# Patient Record
Sex: MALE | Race: WHITE | Employment: FULL TIME | URBAN - METROPOLITAN AREA
[De-identification: names, ages, dates, MRNs, and addresses within clinical notes are randomized per-mention and may not be internally consistent; named-entity substitution may affect disease eponyms.]

---

## 2018-02-16 ENCOUNTER — OFFICE VISIT (OUTPATIENT)
Dept: FAMILY MEDICINE CLINIC | Age: 43
End: 2018-02-16

## 2018-02-16 VITALS
HEIGHT: 69 IN | HEART RATE: 86 BPM | BODY MASS INDEX: 42.95 KG/M2 | DIASTOLIC BLOOD PRESSURE: 86 MMHG | OXYGEN SATURATION: 94 % | WEIGHT: 290 LBS | SYSTOLIC BLOOD PRESSURE: 138 MMHG

## 2018-02-16 DIAGNOSIS — R07.9 CHEST PAIN OF UNKNOWN ETIOLOGY: Primary | ICD-10-CM

## 2018-02-16 DIAGNOSIS — E66.01 MORBIDLY OBESE (HCC): ICD-10-CM

## 2018-02-16 DIAGNOSIS — R07.89 OTHER CHEST PAIN: ICD-10-CM

## 2018-02-16 DIAGNOSIS — K92.1 BLOODY STOOL: ICD-10-CM

## 2018-02-16 LAB
ALBUMIN SERPL-MCNC: 5.1 G/DL (ref 3.4–5)
ALP BLD-CCNC: 84 U/L (ref 40–129)
ALT SERPL-CCNC: 59 U/L (ref 10–40)
ANION GAP SERPL CALCULATED.3IONS-SCNC: 16 MMOL/L (ref 3–16)
AST SERPL-CCNC: 39 U/L (ref 15–37)
BASOPHILS ABSOLUTE: 0.1 K/UL (ref 0–0.2)
BASOPHILS RELATIVE PERCENT: 0.9 %
BILIRUB SERPL-MCNC: 0.5 MG/DL (ref 0–1)
BILIRUBIN DIRECT: <0.2 MG/DL (ref 0–0.3)
BILIRUBIN, INDIRECT: ABNORMAL MG/DL (ref 0–1)
BUN BLDV-MCNC: 12 MG/DL (ref 7–20)
C-REACTIVE PROTEIN: 7.5 MG/L (ref 0–5.1)
CALCIUM SERPL-MCNC: 9.7 MG/DL (ref 8.3–10.6)
CHLORIDE BLD-SCNC: 100 MMOL/L (ref 99–110)
CO2: 26 MMOL/L (ref 21–32)
CREAT SERPL-MCNC: 0.8 MG/DL (ref 0.9–1.3)
EOSINOPHILS ABSOLUTE: 0.1 K/UL (ref 0–0.6)
EOSINOPHILS RELATIVE PERCENT: 0.7 %
GFR AFRICAN AMERICAN: >60
GFR NON-AFRICAN AMERICAN: >60
GLUCOSE BLD-MCNC: 101 MG/DL (ref 70–99)
HCT VFR BLD CALC: 45 % (ref 40.5–52.5)
HEMOGLOBIN: 15.6 G/DL (ref 13.5–17.5)
LYMPHOCYTES ABSOLUTE: 2 K/UL (ref 1–5.1)
LYMPHOCYTES RELATIVE PERCENT: 23.2 %
MCH RBC QN AUTO: 29.9 PG (ref 26–34)
MCHC RBC AUTO-ENTMCNC: 34.6 G/DL (ref 31–36)
MCV RBC AUTO: 86.4 FL (ref 80–100)
MONOCYTES ABSOLUTE: 0.7 K/UL (ref 0–1.3)
MONOCYTES RELATIVE PERCENT: 8 %
NEUTROPHILS ABSOLUTE: 5.9 K/UL (ref 1.7–7.7)
NEUTROPHILS RELATIVE PERCENT: 67.2 %
PDW BLD-RTO: 14.6 % (ref 12.4–15.4)
PLATELET # BLD: 183 K/UL (ref 135–450)
PMV BLD AUTO: 10.1 FL (ref 5–10.5)
POTASSIUM SERPL-SCNC: 4.2 MMOL/L (ref 3.5–5.1)
RBC # BLD: 5.21 M/UL (ref 4.2–5.9)
SODIUM BLD-SCNC: 142 MMOL/L (ref 136–145)
TOTAL PROTEIN: 7.2 G/DL (ref 6.4–8.2)
WBC # BLD: 8.7 K/UL (ref 4–11)

## 2018-02-16 PROCEDURE — 99214 OFFICE O/P EST MOD 30 MIN: CPT | Performed by: NURSE PRACTITIONER

## 2018-02-16 PROCEDURE — 93000 ELECTROCARDIOGRAM COMPLETE: CPT | Performed by: NURSE PRACTITIONER

## 2018-02-16 ASSESSMENT — ENCOUNTER SYMPTOMS
SHORTNESS OF BREATH: 1
NAUSEA: 1
ABDOMINAL PAIN: 1
HEMATOCHEZIA: 1
COUGH: 0

## 2018-02-16 NOTE — PROGRESS NOTES
Subjective:      Chief Complaint   Patient presents with    Rectal Bleeding     mid back pain       Patient ID: Anshu Pinedo is a 38 y.o. male who presents for blood in stool. Noticed bright red blood starting Monday. Blood described as clots. Very loose stool and today diarrhea. He finds blood on the toilet tissue and in the bowl. Toilet water was pink. A few times ago he noticed he had bright red blood in the commode. Negative for colon cancer in family. 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 Positive for diaphoresis, positive for nausea. Chest Pain    This is a recurrent problem. The current episode started more than 1 month ago. The onset quality is sudden. The problem occurs intermittently. The problem has been waxing and waning. The pain is present in the substernal region. The pain is at a severity of 7/10. The pain is moderate. The quality of the pain is described as heavy, pressure and squeezing. The pain radiates to the left arm, left jaw and left neck. Associated symptoms include abdominal pain, nausea and shortness of breath. Pertinent negatives include no cough or diaphoresis. Associated with: known. Risk factors include lack of exercise, stress, sedentary lifestyle, male gender and obesity. His past medical history is significant for anxiety/panic attacks. His family medical history is significant for CAD, heart disease, hyperlipidemia, hypertension and early MI. Prior workup: further cardiology. Rectal Bleeding    The current episode started more than 1 week ago. The onset was sudden. The problem has been rapidly worsening. The patient is experiencing no pain. The stool is described as soft, liquid and mixed with blood. Associated symptoms include abdominal pain, nausea and chest pain.  Pertinent negatives include no coughing. Patient Active Problem List   Diagnosis    Fever        Family History   Problem Relation Age of Onset    Heart Disease Mother        Social History     Social History    Marital status:      Spouse name: N/A    Number of children: N/A    Years of education: N/A     Occupational History    Not on file. Social History Main Topics    Smoking status: Former Smoker     Quit date: 1/1/2014    Smokeless tobacco: Never Used    Alcohol use No    Drug use: Yes     Types: Marijuana    Sexual activity: Not on file     Other Topics Concern    Not on file     Social History Narrative    No narrative on file       Current Outpatient Prescriptions on File Prior to Visit   Medication Sig Dispense Refill    ranitidine (ZANTAC) 150 MG tablet Take 1 tablet by mouth 2 times daily 60 tablet 3     No current facility-administered medications on file prior to visit. Review of Systems   Constitutional: Negative for diaphoresis. Respiratory: Positive for shortness of breath. Negative for cough. Cardiovascular: Positive for chest pain. Gastrointestinal: Positive for abdominal pain, hematochezia and nausea. Neurological: Negative. Psychiatric/Behavioral: The patient is nervous/anxious. Objective:     Physical Exam   Constitutional: He is oriented to person, place, and time. He appears well-developed and well-nourished. HENT:   Head: Normocephalic and atraumatic. Eyes: Conjunctivae are normal.   Neck: Neck supple. Cardiovascular: Regular rhythm and normal heart sounds. Exam reveals no gallop and no friction rub. No murmur heard. Pulmonary/Chest: Effort normal and breath sounds normal. He has no wheezes. He has no rales. Abdominal: Soft. Bowel sounds are normal. He exhibits no distension and no mass. There is tenderness. There is no rebound and no guarding. No hernia. Musculoskeletal: Normal range of motion.    Neurological: He is alert and oriented to person, place,

## 2018-02-16 NOTE — PATIENT INSTRUCTIONS
Thank you for enrolling in [address] Please follow the instructions below to securely access your online medical record. [employer] allows you to send messages to your doctor, view your test results, renew your prescriptions, schedule appointments, and more. How Do I Sign Up? 1. In your Internet browser, go to [email or web address]  2. Click on the Sign Up Now link in the Sign In box. You will see the New Member Sign Up page. 3. Enter your [employer] Access Code exactly as it appears below. You will not need to use this code after youve completed the sign-up process. If you do not sign up before the expiration date, you must request a new code. [employer] Access Code: Activation code not generated  Current [employer] Status: Active    4. Enter your Social Security Number ([id number]) and Date of Birth (mm/dd/yyyy) as indicated and click Submit. You will be taken to the next sign-up page. 5. Create a [employer] ID. This will be your [employer] login ID and cannot be changed, so think of one that is secure and easy to remember. 6. Create a [employer] password. You can change your password at any time. 7. Enter your Password Reset Question and Answer. This can be used at a later time if you forget your password. 8. Enter your e-mail address. You will receive e-mail notification when new information is available in [address] 9. Click Sign Up. You can now view your medical record. Additional Information  If you have questions, please contact your physician practice where you receive care. Remember, [employer] is NOT to be used for urgent needs. For medical emergencies, dial 911.

## 2018-02-20 ENCOUNTER — OFFICE VISIT (OUTPATIENT)
Dept: CARDIOLOGY CLINIC | Age: 43
End: 2018-02-20

## 2018-02-20 VITALS
WEIGHT: 289 LBS | DIASTOLIC BLOOD PRESSURE: 78 MMHG | BODY MASS INDEX: 42.8 KG/M2 | HEIGHT: 69 IN | OXYGEN SATURATION: 94 % | SYSTOLIC BLOOD PRESSURE: 134 MMHG | HEART RATE: 80 BPM

## 2018-02-20 DIAGNOSIS — R06.02 SOB (SHORTNESS OF BREATH): ICD-10-CM

## 2018-02-20 DIAGNOSIS — R07.2 PRECORDIAL PAIN: ICD-10-CM

## 2018-02-20 PROCEDURE — 99244 OFF/OP CNSLTJ NEW/EST MOD 40: CPT | Performed by: INTERNAL MEDICINE

## 2018-02-20 NOTE — COMMUNICATION BODY
Discussed   Former smoker  FH of CAD  Abnormal EKG - reviewed w/ pt      Plan:  [facility] Myoview: Discussed angiogram R/B/A/E  if stress test abnormal   Echo   F/u with me after testing     Luci Slider.  Marylen Norlander, M.D., [facility]

## 2018-02-20 NOTE — LETTER
· Liver/Spleen: No Abnormalities Noted  Neurological:  · Alert and oriented in all spheres  · Moves all extremities well  · Exhibits normal gait balance and coordination  Psychiatric:  · No abnormalities of mood, affect, memory, mentation, or behavior are noted  Skin:  · Warm and Dry  · No rashes        Assessment:     1. Chest pain - atypical   2. SOB (shortness of breath)    3. Blood in stool -  Scheduled to f/u with GI   Obesity - diet and exercise  Discussed   Former smoker  FH of CAD  Abnormal EKG - reviewed w/ pt      Plan:  GXT Myoview: Discussed angiogram R/B/A/E  if stress test abnormal   Echo   F/u with me after testing     Luis Daniel Elvira. Mayte Hickman M.D., Aneta Peoples        If you have questions, please do not hesitate to call me. I look forward to following Marcus Evans along with you.     Sincerely,        Bere Johnson MD

## 2018-02-22 ENCOUNTER — HOSPITAL ENCOUNTER (OUTPATIENT)
Dept: SURGERY | Age: 43
Discharge: HOME | End: 2018-02-22
Attending: INTERNAL MEDICINE | Admitting: INTERNAL MEDICINE

## 2018-02-22 VITALS
TEMPERATURE: 97.5 F | SYSTOLIC BLOOD PRESSURE: 135 MMHG | DIASTOLIC BLOOD PRESSURE: 89 MMHG | BODY MASS INDEX: 42.8 KG/M2 | WEIGHT: 289 LBS | OXYGEN SATURATION: 98 % | RESPIRATION RATE: 18 BRPM | HEIGHT: 69 IN | HEART RATE: 94 BPM

## 2018-02-22 DIAGNOSIS — Z12.11 ENCOUNTER FOR SCREENING FOR MALIGNANT NEOPLASM OF COLON: ICD-10-CM

## 2018-02-22 RX ORDER — IBUPROFEN 400 MG/1
400 TABLET ORAL EVERY 6 HOURS PRN
COMMUNITY
End: 2018-03-23

## 2018-02-22 RX ORDER — LIDOCAINE HYDROCHLORIDE 10 MG/ML
0.1 INJECTION, SOLUTION EPIDURAL; INFILTRATION; INTRACAUDAL; PERINEURAL
Status: ACTIVE | OUTPATIENT
Start: 2018-02-22 | End: 2018-02-22

## 2018-02-22 RX ORDER — SODIUM CHLORIDE, SODIUM LACTATE, POTASSIUM CHLORIDE, CALCIUM CHLORIDE 600; 310; 30; 20 MG/100ML; MG/100ML; MG/100ML; MG/100ML
INJECTION, SOLUTION INTRAVENOUS ONCE
Status: COMPLETED | OUTPATIENT
Start: 2018-02-22 | End: 2018-02-22

## 2018-02-22 RX ORDER — ACETAMINOPHEN 325 MG/1
1000 TABLET ORAL EVERY 6 HOURS PRN
COMMUNITY
End: 2022-08-19

## 2018-02-22 RX ADMIN — SODIUM CHLORIDE, SODIUM LACTATE, POTASSIUM CHLORIDE, CALCIUM CHLORIDE: 600; 310; 30; 20 INJECTION, SOLUTION INTRAVENOUS at 08:18

## 2018-02-22 ASSESSMENT — PAIN - FUNCTIONAL ASSESSMENT: PAIN_FUNCTIONAL_ASSESSMENT: 0-10

## 2018-02-22 ASSESSMENT — PAIN SCALES - GENERAL: PAINLEVEL_OUTOF10: 0

## 2018-02-22 NOTE — OP NOTE
Colonoscopy Note    Patient:   Fred Aguilar    YOB: 1975    Facility:   [facility] [Outpatient]  Referring/PCP: Glo Bran MD  Procedure:   Colonoscopy --diagnostic  Date:     2/22/2018  Endoscopist:  Valeriy Campuzano MD    Preoperative Diagnosis: Hematochezia and chronic diarrhea. Postoperative Diagnosis:  Polyps     Anesthesia: Versed 12 mg IV, fentanyl 100 mcg IV    Estimated blood loss: < 5 ml    Complications:  None    Description of Procedure:  Informed consent was obtained from the patient after explanation of the procedure including indications, description of the procedure,  benefits and possible risks and complications of the procedure, and alternatives. Questions were answered. The patient's history was reviewed and a directed physical examination was performed prior to the procedure. Patient was monitored throughout the procedure with pulse oximetry and periodic assessment of vital signs. Patient was sedated as noted above. The Nursing staff and I performed a time out. With the patient initially in the left lateral decubitus position, a digital rectal examination was performed and revealed negative without mass, lesions or tenderness. The Olympus video colonoscope was placed in the patient's rectum and advanced without difficulty  to the cecum, which was identified by the ileocecal valve and appendiceal orifice. Photographs were taken. The prep was good. Examination of the mucosa was performed during both introduction and withdrawal of the colonoscope. Retroflexed view of the rectum was performed. Findings:     1. Small transv colon polyp removed w cold-Bx and 1 cm sigmoid polyp cold-snared  2. Normal mucosa, random-biopsied and small int hemorrhoids     Recommendations:  Await pathology.      Valeriy Campuzano MD       (O) [phone]        Valeriy Campuzano MD

## 2018-02-23 ENCOUNTER — HOSPITAL ENCOUNTER (OUTPATIENT)
Dept: CARDIOLOGY | Age: 43
Discharge: HOME | End: 2018-02-23
Attending: INTERNAL MEDICINE | Admitting: INTERNAL MEDICINE

## 2018-02-23 ENCOUNTER — HOSPITAL ENCOUNTER (OUTPATIENT)
Dept: CARDIOLOGY | Age: 43
Discharge: HOME | End: 2018-02-24
Admitting: INTERNAL MEDICINE

## 2018-02-23 ENCOUNTER — TELEPHONE (OUTPATIENT)
Dept: CARDIOLOGY CLINIC | Age: 43
End: 2018-02-23

## 2018-02-23 DIAGNOSIS — Z12.11 ENCOUNTER FOR SCREENING FOR MALIGNANT NEOPLASM OF COLON: ICD-10-CM

## 2018-02-23 LAB
LV EF: 55 %
LV EF: 66 %
LVEF MODALITY: NORMAL
LVEF MODALITY: NORMAL

## 2018-03-02 ENCOUNTER — TELEPHONE (OUTPATIENT)
Dept: FAMILY MEDICINE CLINIC | Age: 43
End: 2018-03-02

## 2018-03-09 ENCOUNTER — TELEPHONE (OUTPATIENT)
Dept: FAMILY MEDICINE CLINIC | Age: 43
End: 2018-03-09

## 2018-03-09 ENCOUNTER — OFFICE VISIT (OUTPATIENT)
Dept: FAMILY MEDICINE CLINIC | Age: 43
End: 2018-03-09

## 2018-03-09 VITALS
BODY MASS INDEX: 38.04 KG/M2 | SYSTOLIC BLOOD PRESSURE: 156 MMHG | HEART RATE: 104 BPM | OXYGEN SATURATION: 97 % | HEIGHT: 73 IN | DIASTOLIC BLOOD PRESSURE: 102 MMHG | WEIGHT: 287 LBS

## 2018-03-09 DIAGNOSIS — R53.83 FATIGUE, UNSPECIFIED TYPE: Primary | ICD-10-CM

## 2018-03-09 DIAGNOSIS — K64.8 INFLAMED INTERNAL HEMORRHOID: ICD-10-CM

## 2018-03-09 PROCEDURE — 99214 OFFICE O/P EST MOD 30 MIN: CPT | Performed by: NURSE PRACTITIONER

## 2018-03-09 RX ORDER — HYDROCODONE BITARTRATE AND ACETAMINOPHEN 10; 325 MG/1; MG/1
1 TABLET ORAL EVERY 6 HOURS PRN
Qty: 30 TABLET | Refills: 0 | Status: SHIPPED | OUTPATIENT
Start: 2018-03-09 | End: 2018-03-16

## 2018-03-09 ASSESSMENT — PATIENT HEALTH QUESTIONNAIRE - PHQ9
1. LITTLE INTEREST OR PLEASURE IN DOING THINGS: 0
SUM OF ALL RESPONSES TO PHQ QUESTIONS 1-9: 0
2. FEELING DOWN, DEPRESSED OR HOPELESS: 0
SUM OF ALL RESPONSES TO PHQ9 QUESTIONS 1 & 2: 0

## 2018-03-09 ASSESSMENT — ENCOUNTER SYMPTOMS
RESPIRATORY NEGATIVE: 1
BACK PAIN: 1
HEARTBURN: 1

## 2018-03-09 NOTE — PROGRESS NOTES
pains  & joint pain      ranitidine (ZANTAC) 150 MG tablet Take 1 tablet by mouth 2 times daily 60 tablet 3     No current facility-administered medications on file prior to visit. Review of Systems   Respiratory: Negative. Cardiovascular: Negative. Hypertension BP upon admission to room 166/102 rechecked it was 144/88   Gastrointestinal: Positive for heartburn. Internal hemorrhoids   Musculoskeletal: Positive for back pain, joint pain, myalgias and neck pain. Patient Active Problem List    Diagnosis Date Noted    Precordial pain 02/20/2018    SOB (shortness of breath) 02/20/2018    Fever 08/15/2016       Objective:     Physical Exam   Constitutional: He is oriented to person, place, and time. He appears well-developed and well-nourished. HENT:   Head: Normocephalic and atraumatic. Eyes: Conjunctivae are normal.   Neck: Neck supple. Cardiovascular: Regular rhythm and normal heart sounds. Exam reveals no gallop and no friction rub. No murmur heard. Pulmonary/Chest: Effort normal and breath sounds normal. He has no wheezes. He has no rales. Abdominal: Soft. Musculoskeletal: Normal range of motion. Neurological: He is alert and oriented to person, place, and time. Skin: Capillary refill takes 2 to 3 seconds. Psychiatric: He has a normal mood and affect. His behavior is normal. Judgment and thought content normal.   Nursing note and vitals reviewed. Assessment:       ICD-10-CM ICD-9-CM    1. Fatigue, unspecified type R53.83 780.79 TSH without Reflex      T4      Vitamin D 25 Hydroxy      Corrine Ragland MD (Rheumatology)   2.  Inflamed internal hemorrhoid K64.8 455.0 HYDROcodone-acetaminophen (NORCO)  MG per tablet       Plan:     Orders Placed This Encounter   Procedures    TSH without Reflex     Standing Status:   Future     Standing Expiration Date:   3/9/2019    T4     Standing Status:   Future     Standing Expiration Date:   3/9/2019   [facility]

## 2018-03-09 NOTE — TELEPHONE ENCOUNTER
Called patient to discuss labs, he wishes to come in for an appointment and talk about them.  Appointment will be made by the

## 2018-03-09 NOTE — PATIENT INSTRUCTIONS
Thank you for enrolling in [address] Please follow the instructions below to securely access your online medical record. [employer] allows you to send messages to your doctor, view your test results, renew your prescriptions, schedule appointments, and more. How Do I Sign Up? 1. In your Internet browser, go to [email or web address]  2. Click on the Sign Up Now link in the Sign In box. You will see the New Member Sign Up page. 3. Enter your [employer] Access Code exactly as it appears below. You will not need to use this code after youve completed the sign-up process. If you do not sign up before the expiration date, you must request a new code. [employer] Access Code: Activation code not generated  Current [employer] Status: Active    4. Enter your Social Security Number ([id number]) and Date of Birth (mm/dd/yyyy) as indicated and click Submit. You will be taken to the next sign-up page. 5. Create a [employer] ID. This will be your [employer] login ID and cannot be changed, so think of one that is secure and easy to remember. 6. Create a [employer] password. You can change your password at any time. 7. Enter your Password Reset Question and Answer. This can be used at a later time if you forget your password. 8. Enter your e-mail address. You will receive e-mail notification when new information is available in [address] 9. Click Sign Up. You can now view your medical record. Additional Information  If you have questions, please contact your physician practice where you receive care. Remember, [employer] is NOT to be used for urgent needs. For medical emergencies, dial 911.

## 2018-03-12 ENCOUNTER — TELEPHONE (OUTPATIENT)
Dept: FAMILY MEDICINE CLINIC | Age: 43
End: 2018-03-12

## 2018-03-12 DIAGNOSIS — K64.8 OTHER HEMORRHOIDS: Primary | ICD-10-CM

## 2018-03-14 ENCOUNTER — TELEPHONE (OUTPATIENT)
Dept: FAMILY MEDICINE CLINIC | Age: 43
End: 2018-03-14

## 2018-03-14 NOTE — TELEPHONE ENCOUNTER
Dinora called back and the [place] is in regards to the dates. Backdate Feb 20th, 22nd, and 23rd. End date is unknown because [place] has two more appts with specialist and that will determine if he needs to do anything else with them.  Those appts are at the end of March    [place] said her company gave her intermittent leave until August to cover any other appts in between      Richiee tree phone#: [phone] if any other questions

## 2018-03-19 ENCOUNTER — TELEPHONE (OUTPATIENT)
Dept: FAMILY MEDICINE CLINIC | Age: 43
End: 2018-03-19

## 2018-03-23 ENCOUNTER — OFFICE VISIT (OUTPATIENT)
Dept: RHEUMATOLOGY | Age: 43
End: 2018-03-23

## 2018-03-23 ENCOUNTER — HOSPITAL ENCOUNTER (OUTPATIENT)
Dept: OTHER | Age: 43
Discharge: HOME | End: 2018-03-23
Attending: INTERNAL MEDICINE | Admitting: INTERNAL MEDICINE

## 2018-03-23 VITALS
TEMPERATURE: 98 F | BODY MASS INDEX: 37.51 KG/M2 | SYSTOLIC BLOOD PRESSURE: 120 MMHG | HEIGHT: 73 IN | DIASTOLIC BLOOD PRESSURE: 80 MMHG | WEIGHT: 283 LBS

## 2018-03-23 DIAGNOSIS — M25.50 POLYARTHRALGIA: ICD-10-CM

## 2018-03-23 DIAGNOSIS — M25.50 POLYARTHRALGIA: Primary | ICD-10-CM

## 2018-03-23 DIAGNOSIS — R79.89 ELEVATED LFTS: ICD-10-CM

## 2018-03-23 LAB
RHEUMATOID FACTOR: <10 IU/ML
URIC ACID, SERUM: 7.5 MG/DL (ref 3.5–7.2)

## 2018-03-23 PROCEDURE — 99245 OFF/OP CONSLTJ NEW/EST HI 55: CPT | Performed by: INTERNAL MEDICINE

## 2018-03-23 RX ORDER — PREDNISONE 10 MG/1
TABLET ORAL
Qty: 35 TABLET | Refills: 0 | Status: SHIPPED | OUTPATIENT
Start: 2018-03-23 | End: 2019-12-12

## 2018-03-23 RX ORDER — HYDROCODONE BITARTRATE AND ACETAMINOPHEN 10; 325 MG/1; MG/1
1 TABLET ORAL EVERY 6 HOURS PRN
COMMUNITY
End: 2019-12-12

## 2018-03-23 NOTE — PROGRESS NOTES
paresthesia. All other ROS are negative. No past medical history on file. No past surgical history on file. No family history of autoimmune diseases    Current Outpatient Prescriptions   Medication Sig Dispense Refill    HYDROcodone-acetaminophen (NORCO)  MG per tablet Take 1 tablet by mouth every 6 hours as needed for Pain.  predniSONE (DELTASONE) 10 MG tablet 2 tab po daily x 7 days. 1.5 tab po daily x 7 days. 1 tab po daily x 7 days. 1/2 tab po daily 7 days and stop. 35 tablet 0    acetaminophen (TYLENOL) 325 MG tablet Take 1,000 mg by mouth every 6 hours as needed for Pain      ranitidine (ZANTAC) 150 MG tablet Take 1 tablet by mouth 2 times daily 60 tablet 3     No current facility-administered medications for this visit. No Known Allergies    PHYSICAL EXAM:    Vitals:    /80   Temp 98 °F (36.7 °C) (Oral)   Ht 6' 0.99\" (1.854 m)   Wt 283 lb (128.4 kg)   BMI 37.35 kg/m²   General appearance/ Psychiatric: well nourished, and well groomed, normal judgement, alert, appears stated age and cooperative. MKS:   28 joint JOINT COUNT:                               Right                                                  Left   Swell Tender Swell Tender   PIP1           PIP2           PIP3           PIP4          PIP5          MCP1           MCP2           MCP3           MCP4           MCP5           Wrist           Elbow           Shoulder          Knee             Subjective arthralgias across MCPs, PIPs without any objective tenderness, swelling or synovitis. Full fist bilaterally. Bony crepitus in both knees, right knee has trace effusion, has full range of motion, without any tenderness. Ankle and feet joints are non-tender. Hips have full range of motion. Spine-paraspinal muscle spasm mainly in thoracic-lower and upper lumbar area with tenderness. SLR negative.   Gait- Normal   Muscle strength 5/5 proximally and distally in upper and lower extremities  FMS tender points 4

## 2018-03-25 LAB — CCP IGG ANTIBODIES: 4 UNITS (ref 0–19)

## 2018-03-27 ENCOUNTER — OFFICE VISIT (OUTPATIENT)
Dept: SURGERY | Age: 43
End: 2018-03-27

## 2018-03-27 VITALS
BODY MASS INDEX: 42.8 KG/M2 | WEIGHT: 289 LBS | DIASTOLIC BLOOD PRESSURE: 80 MMHG | SYSTOLIC BLOOD PRESSURE: 132 MMHG | HEIGHT: 69 IN

## 2018-03-27 DIAGNOSIS — R19.7 DIARRHEA, UNSPECIFIED TYPE: ICD-10-CM

## 2018-03-27 DIAGNOSIS — K64.8 BLEEDING INTERNAL HEMORRHOIDS: Primary | ICD-10-CM

## 2018-03-27 DIAGNOSIS — D36.9 TUBULAR ADENOMA: ICD-10-CM

## 2018-03-27 PROCEDURE — 99203 OFFICE O/P NEW LOW 30 MIN: CPT | Performed by: SURGERY

## 2018-03-27 NOTE — PATIENT INSTRUCTIONS
TOILET. DO NOT READ OR PLAY CELL PHONE GAMES ON THE TOILET. Stool regimen should be trialed for 6-8 weeks before considering additional procedures or surgery. What procedures are available for internal hemorrhoids that do not respond to the above stool regimen:    · Rubber Band Ligation: This procedure is performed in the office without anesthetic. A small scope is placed into the anus and small rubber bands are placed over the hemorrhoid tissue. This causes excess tissue to fall off and scar into the rectum. This is done for patients with internal hemorrhoids only. Often this procedure needs to be repeated. Complication rates are very low. There is only minor discomfort and no down time. · Surgical excision (Hemorrhoidectomy): This surgery is performed in the operating room with sedation. The hemorrhoid tissue is cut out and wounds are stitched back together. It has low complications rates and has a 95% long term success rate. It is a painful procedure, however, and most patients require at least 2 weeks off from work/school. This may be recommended for patients with large hemorrhoids, and those who wish to have internal and external hemorrhoids addressed simultaneously, and those who desire the most definitive procedure. · Colonoscopy: This is an adjunct procedure in patients with rectal bleeding. Depending on your age and family history, colonoscopy may be recommended before pursuing hemorrhoid procedures. This is to ensure that the source of bleeding is truly hemorrhoids, and not from polyps, cancer, or inflammation located elsewhere in the colon or rectum. What about over-the-counter ointments, creams, and suppositories? Unfortunately for consumers, there is very little actual scientific data to support the use of any over-the-counter products. These usually aren't harmful to try for a few weeks, and may help with some symptoms, but all in all are a waste of money.  Again, these will just

## 2018-04-06 ENCOUNTER — OFFICE VISIT (OUTPATIENT)
Dept: RHEUMATOLOGY | Age: 43
End: 2018-04-06

## 2018-04-06 VITALS
HEIGHT: 69 IN | BODY MASS INDEX: 42.21 KG/M2 | SYSTOLIC BLOOD PRESSURE: 120 MMHG | TEMPERATURE: 98.7 F | DIASTOLIC BLOOD PRESSURE: 78 MMHG | WEIGHT: 285 LBS

## 2018-04-06 DIAGNOSIS — M15.9 GENERALIZED OSTEOARTHRITIS: Primary | ICD-10-CM

## 2018-04-06 DIAGNOSIS — M17.0 PRIMARY OSTEOARTHRITIS OF BOTH KNEES: ICD-10-CM

## 2018-04-06 PROCEDURE — 20610 DRAIN/INJ JOINT/BURSA W/O US: CPT | Performed by: INTERNAL MEDICINE

## 2018-04-06 PROCEDURE — 99214 OFFICE O/P EST MOD 30 MIN: CPT | Performed by: INTERNAL MEDICINE

## 2018-04-06 RX ORDER — METHOCARBAMOL 500 MG/1
TABLET, FILM COATED ORAL
Qty: 90 TABLET | Refills: 1 | Status: SHIPPED | OUTPATIENT
Start: 2018-04-06 | End: 2019-12-12

## 2018-04-06 RX ORDER — TRIAMCINOLONE ACETONIDE 40 MG/ML
40 INJECTION, SUSPENSION INTRA-ARTICULAR; INTRAMUSCULAR ONCE
Status: COMPLETED | OUTPATIENT
Start: 2018-04-06 | End: 2018-04-06

## 2018-04-06 RX ORDER — TRAMADOL HYDROCHLORIDE 50 MG/1
TABLET ORAL
Qty: 60 TABLET | Refills: 2 | Status: SHIPPED | OUTPATIENT
Start: 2018-04-06 | End: 2018-05-06

## 2018-04-06 RX ADMIN — TRIAMCINOLONE ACETONIDE 40 MG: 40 INJECTION, SUSPENSION INTRA-ARTICULAR; INTRAMUSCULAR at 10:15

## 2018-04-06 RX ADMIN — TRIAMCINOLONE ACETONIDE 40 MG: 40 INJECTION, SUSPENSION INTRA-ARTICULAR; INTRAMUSCULAR at 10:20

## 2018-04-10 ENCOUNTER — TELEPHONE (OUTPATIENT)
Dept: RHEUMATOLOGY | Age: 43
End: 2018-04-10

## 2018-04-11 DIAGNOSIS — M25.562 CHRONIC PAIN OF LEFT KNEE: Primary | ICD-10-CM

## 2018-04-11 DIAGNOSIS — G89.29 CHRONIC PAIN OF LEFT KNEE: Primary | ICD-10-CM

## 2018-04-11 RX ORDER — HYDROCODONE BITARTRATE AND ACETAMINOPHEN 5; 325 MG/1; MG/1
TABLET ORAL
Qty: 28 TABLET | Refills: 0 | Status: SHIPPED | OUTPATIENT
Start: 2018-04-11 | End: 2018-05-11

## 2019-12-04 ENCOUNTER — OFFICE VISIT (OUTPATIENT)
Dept: ORTHOPEDIC SURGERY | Age: 44
End: 2019-12-04
Payer: COMMERCIAL

## 2019-12-04 VITALS
HEIGHT: 69 IN | WEIGHT: 285.06 LBS | DIASTOLIC BLOOD PRESSURE: 91 MMHG | HEART RATE: 96 BPM | BODY MASS INDEX: 42.22 KG/M2 | SYSTOLIC BLOOD PRESSURE: 143 MMHG

## 2019-12-04 DIAGNOSIS — S46.211A RUPTURE OF RIGHT DISTAL BICEPS TENDON, INITIAL ENCOUNTER: ICD-10-CM

## 2019-12-04 DIAGNOSIS — M25.521 RIGHT ELBOW PAIN: Primary | ICD-10-CM

## 2019-12-04 PROCEDURE — 99203 OFFICE O/P NEW LOW 30 MIN: CPT | Performed by: PHYSICIAN ASSISTANT

## 2019-12-04 PROCEDURE — 29105 APPLICATION LONG ARM SPLINT: CPT | Performed by: PHYSICIAN ASSISTANT

## 2019-12-05 ENCOUNTER — TELEPHONE (OUTPATIENT)
Dept: ORTHOPEDIC SURGERY | Age: 44
End: 2019-12-05

## 2019-12-12 ENCOUNTER — ANESTHESIA EVENT (OUTPATIENT)
Dept: OPERATING ROOM | Age: 44
End: 2019-12-12
Payer: COMMERCIAL

## 2019-12-12 ENCOUNTER — OFFICE VISIT (OUTPATIENT)
Dept: ORTHOPEDIC SURGERY | Age: 44
End: 2019-12-12
Payer: COMMERCIAL

## 2019-12-12 ENCOUNTER — TELEPHONE (OUTPATIENT)
Dept: ORTHOPEDIC SURGERY | Age: 44
End: 2019-12-12

## 2019-12-12 VITALS — WEIGHT: 285.06 LBS | BODY MASS INDEX: 42.22 KG/M2 | RESPIRATION RATE: 17 BRPM | HEIGHT: 69 IN

## 2019-12-12 DIAGNOSIS — S46.211A RUPTURE OF RIGHT DISTAL BICEPS TENDON, INITIAL ENCOUNTER: Primary | ICD-10-CM

## 2019-12-12 PROCEDURE — 99242 OFF/OP CONSLTJ NEW/EST SF 20: CPT | Performed by: ORTHOPAEDIC SURGERY

## 2019-12-12 RX ORDER — IBUPROFEN 800 MG/1
800 TABLET ORAL EVERY 6 HOURS PRN
COMMUNITY
End: 2020-03-13 | Stop reason: ALTCHOICE

## 2019-12-13 ENCOUNTER — HOSPITAL ENCOUNTER (OUTPATIENT)
Age: 44
Setting detail: OUTPATIENT SURGERY
Discharge: HOME | End: 2019-12-13
Attending: ORTHOPAEDIC SURGERY | Admitting: ORTHOPAEDIC SURGERY
Payer: COMMERCIAL

## 2019-12-13 ENCOUNTER — ANESTHESIA (OUTPATIENT)
Dept: OPERATING ROOM | Age: 44
End: 2019-12-13
Payer: COMMERCIAL

## 2019-12-13 VITALS
BODY MASS INDEX: 42.81 KG/M2 | DIASTOLIC BLOOD PRESSURE: 72 MMHG | HEART RATE: 86 BPM | SYSTOLIC BLOOD PRESSURE: 118 MMHG | RESPIRATION RATE: 11 BRPM | OXYGEN SATURATION: 95 % | TEMPERATURE: 98.4 F | WEIGHT: 290 LBS

## 2019-12-13 VITALS — DIASTOLIC BLOOD PRESSURE: 118 MMHG | OXYGEN SATURATION: 96 % | SYSTOLIC BLOOD PRESSURE: 150 MMHG | TEMPERATURE: 97.7 F

## 2019-12-13 DIAGNOSIS — S46.211A RUPTURE OF RIGHT DISTAL BICEPS TENDON, INITIAL ENCOUNTER: Primary | ICD-10-CM

## 2019-12-13 PROCEDURE — 3600000005 HC SURGERY LEVEL 5 BASE: Performed by: ORTHOPAEDIC SURGERY

## 2019-12-13 PROCEDURE — 2580000003 HC RX 258: Performed by: ANESTHESIOLOGY

## 2019-12-13 PROCEDURE — 2500000003 HC RX 250 WO HCPCS: Performed by: NURSE ANESTHETIST, CERTIFIED REGISTERED

## 2019-12-13 PROCEDURE — 6360000002 HC RX W HCPCS: Performed by: ORTHOPAEDIC SURGERY

## 2019-12-13 PROCEDURE — C1713 ANCHOR/SCREW BN/BN,TIS/BN: HCPCS | Performed by: ORTHOPAEDIC SURGERY

## 2019-12-13 PROCEDURE — 3700000000 HC ANESTHESIA ATTENDED CARE: Performed by: ORTHOPAEDIC SURGERY

## 2019-12-13 PROCEDURE — 6360000002 HC RX W HCPCS: Performed by: NURSE ANESTHETIST, CERTIFIED REGISTERED

## 2019-12-13 PROCEDURE — 2500000003 HC RX 250 WO HCPCS: Performed by: ANESTHESIOLOGY

## 2019-12-13 PROCEDURE — 3600000015 HC SURGERY LEVEL 5 ADDTL 15MIN: Performed by: ORTHOPAEDIC SURGERY

## 2019-12-13 PROCEDURE — 2709999900 HC NON-CHARGEABLE SUPPLY: Performed by: ORTHOPAEDIC SURGERY

## 2019-12-13 PROCEDURE — 7100000011 HC PHASE II RECOVERY - ADDTL 15 MIN: Performed by: ORTHOPAEDIC SURGERY

## 2019-12-13 PROCEDURE — 7100000000 HC PACU RECOVERY - FIRST 15 MIN: Performed by: ORTHOPAEDIC SURGERY

## 2019-12-13 PROCEDURE — 6370000000 HC RX 637 (ALT 250 FOR IP): Performed by: ANESTHESIOLOGY

## 2019-12-13 PROCEDURE — 2580000003 HC RX 258: Performed by: ORTHOPAEDIC SURGERY

## 2019-12-13 PROCEDURE — 64415 NJX AA&/STRD BRCH PLXS IMG: CPT | Performed by: ANESTHESIOLOGY

## 2019-12-13 PROCEDURE — 6360000002 HC RX W HCPCS: Performed by: ANESTHESIOLOGY

## 2019-12-13 PROCEDURE — 3700000001 HC ADD 15 MINUTES (ANESTHESIA): Performed by: ORTHOPAEDIC SURGERY

## 2019-12-13 PROCEDURE — 7100000001 HC PACU RECOVERY - ADDTL 15 MIN: Performed by: ORTHOPAEDIC SURGERY

## 2019-12-13 PROCEDURE — 7100000010 HC PHASE II RECOVERY - FIRST 15 MIN: Performed by: ORTHOPAEDIC SURGERY

## 2019-12-13 DEVICE — SYSTEM IMPL DST BICEPS REP DEL W/ BICEPS BTTN 7X10MM PEEK: Type: IMPLANTABLE DEVICE | Site: ELBOW | Status: FUNCTIONAL

## 2019-12-13 RX ORDER — FENTANYL CITRATE 50 UG/ML
INJECTION, SOLUTION INTRAMUSCULAR; INTRAVENOUS PRN
Status: DISCONTINUED | OUTPATIENT
Start: 2019-12-13 | End: 2019-12-13 | Stop reason: SDUPTHER

## 2019-12-13 RX ORDER — LIDOCAINE HYDROCHLORIDE 20 MG/ML
INJECTION, SOLUTION INFILTRATION; PERINEURAL PRN
Status: DISCONTINUED | OUTPATIENT
Start: 2019-12-13 | End: 2019-12-13 | Stop reason: SDUPTHER

## 2019-12-13 RX ORDER — SODIUM CHLORIDE 0.9 % (FLUSH) 0.9 %
10 SYRINGE (ML) INJECTION EVERY 12 HOURS SCHEDULED
Status: DISCONTINUED | OUTPATIENT
Start: 2019-12-13 | End: 2019-12-13 | Stop reason: HOSPADM

## 2019-12-13 RX ORDER — OXYCODONE HYDROCHLORIDE AND ACETAMINOPHEN 5; 325 MG/1; MG/1
1 TABLET ORAL EVERY 6 HOURS PRN
Qty: 28 TABLET | Refills: 0 | Status: SHIPPED | OUTPATIENT
Start: 2019-12-13 | End: 2019-12-20

## 2019-12-13 RX ORDER — ONDANSETRON 2 MG/ML
4 INJECTION INTRAMUSCULAR; INTRAVENOUS EVERY 30 MIN PRN
Status: DISCONTINUED | OUTPATIENT
Start: 2019-12-13 | End: 2019-12-13 | Stop reason: HOSPADM

## 2019-12-13 RX ORDER — MAGNESIUM HYDROXIDE 1200 MG/15ML
LIQUID ORAL CONTINUOUS PRN
Status: COMPLETED | OUTPATIENT
Start: 2019-12-13 | End: 2019-12-13

## 2019-12-13 RX ORDER — BUPIVACAINE HYDROCHLORIDE AND EPINEPHRINE 5; 5 MG/ML; UG/ML
INJECTION, SOLUTION EPIDURAL; INTRACAUDAL; PERINEURAL PRN
Status: DISCONTINUED | OUTPATIENT
Start: 2019-12-13 | End: 2019-12-13 | Stop reason: SDUPTHER

## 2019-12-13 RX ORDER — ROCURONIUM BROMIDE 10 MG/ML
INJECTION, SOLUTION INTRAVENOUS PRN
Status: DISCONTINUED | OUTPATIENT
Start: 2019-12-13 | End: 2019-12-13 | Stop reason: SDUPTHER

## 2019-12-13 RX ORDER — OXYCODONE HYDROCHLORIDE AND ACETAMINOPHEN 5; 325 MG/1; MG/1
2 TABLET ORAL PRN
Status: COMPLETED | OUTPATIENT
Start: 2019-12-13 | End: 2019-12-13

## 2019-12-13 RX ORDER — SODIUM CHLORIDE 0.9 % (FLUSH) 0.9 %
10 SYRINGE (ML) INJECTION PRN
Status: DISCONTINUED | OUTPATIENT
Start: 2019-12-13 | End: 2019-12-13 | Stop reason: HOSPADM

## 2019-12-13 RX ORDER — SODIUM CHLORIDE, SODIUM LACTATE, POTASSIUM CHLORIDE, CALCIUM CHLORIDE 600; 310; 30; 20 MG/100ML; MG/100ML; MG/100ML; MG/100ML
INJECTION, SOLUTION INTRAVENOUS CONTINUOUS
Status: DISCONTINUED | OUTPATIENT
Start: 2019-12-13 | End: 2019-12-13 | Stop reason: HOSPADM

## 2019-12-13 RX ORDER — HYDRALAZINE HYDROCHLORIDE 20 MG/ML
5 INJECTION INTRAMUSCULAR; INTRAVENOUS EVERY 30 MIN PRN
Status: DISCONTINUED | OUTPATIENT
Start: 2019-12-13 | End: 2019-12-13 | Stop reason: HOSPADM

## 2019-12-13 RX ORDER — LABETALOL HYDROCHLORIDE 5 MG/ML
5 INJECTION, SOLUTION INTRAVENOUS
Status: DISCONTINUED | OUTPATIENT
Start: 2019-12-13 | End: 2019-12-13 | Stop reason: HOSPADM

## 2019-12-13 RX ORDER — PROPOFOL 10 MG/ML
INJECTION, EMULSION INTRAVENOUS PRN
Status: DISCONTINUED | OUTPATIENT
Start: 2019-12-13 | End: 2019-12-13 | Stop reason: SDUPTHER

## 2019-12-13 RX ORDER — DEXAMETHASONE SODIUM PHOSPHATE 4 MG/ML
INJECTION, SOLUTION INTRA-ARTICULAR; INTRALESIONAL; INTRAMUSCULAR; INTRAVENOUS; SOFT TISSUE PRN
Status: DISCONTINUED | OUTPATIENT
Start: 2019-12-13 | End: 2019-12-13 | Stop reason: SDUPTHER

## 2019-12-13 RX ORDER — DIPHENHYDRAMINE HYDROCHLORIDE 50 MG/ML
6.25 INJECTION INTRAMUSCULAR; INTRAVENOUS
Status: DISCONTINUED | OUTPATIENT
Start: 2019-12-13 | End: 2019-12-13 | Stop reason: HOSPADM

## 2019-12-13 RX ORDER — OXYCODONE HYDROCHLORIDE AND ACETAMINOPHEN 5; 325 MG/1; MG/1
1 TABLET ORAL PRN
Status: COMPLETED | OUTPATIENT
Start: 2019-12-13 | End: 2019-12-13

## 2019-12-13 RX ORDER — MIDAZOLAM HYDROCHLORIDE 1 MG/ML
INJECTION INTRAMUSCULAR; INTRAVENOUS PRN
Status: DISCONTINUED | OUTPATIENT
Start: 2019-12-13 | End: 2019-12-13 | Stop reason: SDUPTHER

## 2019-12-13 RX ORDER — ONDANSETRON 2 MG/ML
INJECTION INTRAMUSCULAR; INTRAVENOUS PRN
Status: DISCONTINUED | OUTPATIENT
Start: 2019-12-13 | End: 2019-12-13 | Stop reason: SDUPTHER

## 2019-12-13 RX ADMIN — DEXAMETHASONE SODIUM PHOSPHATE 8 MG: 4 INJECTION, SOLUTION INTRAMUSCULAR; INTRAVENOUS at 16:02

## 2019-12-13 RX ADMIN — FENTANYL CITRATE 50 MCG: 50 INJECTION INTRAMUSCULAR; INTRAVENOUS at 16:17

## 2019-12-13 RX ADMIN — MIDAZOLAM HYDROCHLORIDE 5 MG: 2 INJECTION, SOLUTION INTRAMUSCULAR; INTRAVENOUS at 15:25

## 2019-12-13 RX ADMIN — FAMOTIDINE 20 MG: 10 INJECTION, SOLUTION INTRAVENOUS at 15:06

## 2019-12-13 RX ADMIN — SODIUM CHLORIDE, POTASSIUM CHLORIDE, SODIUM LACTATE AND CALCIUM CHLORIDE: 600; 310; 30; 20 INJECTION, SOLUTION INTRAVENOUS at 16:02

## 2019-12-13 RX ADMIN — HYDROMORPHONE HYDROCHLORIDE 0.5 MG: 1 INJECTION, SOLUTION INTRAMUSCULAR; INTRAVENOUS; SUBCUTANEOUS at 17:58

## 2019-12-13 RX ADMIN — OXYCODONE HYDROCHLORIDE AND ACETAMINOPHEN 2 TABLET: 5; 325 TABLET ORAL at 18:48

## 2019-12-13 RX ADMIN — HYDROMORPHONE HYDROCHLORIDE 0.5 MG: 1 INJECTION, SOLUTION INTRAMUSCULAR; INTRAVENOUS; SUBCUTANEOUS at 17:43

## 2019-12-13 RX ADMIN — FENTANYL CITRATE 25 MCG: 50 INJECTION INTRAMUSCULAR; INTRAVENOUS at 16:52

## 2019-12-13 RX ADMIN — ROCURONIUM BROMIDE 50 MG: 10 SOLUTION INTRAVENOUS at 15:56

## 2019-12-13 RX ADMIN — BUPIVACAINE HYDROCHLORIDE AND EPINEPHRINE 30 ML: 5; 5 INJECTION, SOLUTION EPIDURAL; INTRACAUDAL; PERINEURAL at 15:25

## 2019-12-13 RX ADMIN — HYDROMORPHONE HYDROCHLORIDE 0.5 MG: 1 INJECTION, SOLUTION INTRAMUSCULAR; INTRAVENOUS; SUBCUTANEOUS at 17:52

## 2019-12-13 RX ADMIN — PROPOFOL 25 MG: 10 INJECTION, EMULSION INTRAVENOUS at 16:47

## 2019-12-13 RX ADMIN — PROPOFOL 250 MG: 10 INJECTION, EMULSION INTRAVENOUS at 15:56

## 2019-12-13 RX ADMIN — ONDANSETRON 4 MG: 2 INJECTION INTRAMUSCULAR; INTRAVENOUS at 16:02

## 2019-12-13 RX ADMIN — HYDROMORPHONE HYDROCHLORIDE 0.5 MG: 1 INJECTION, SOLUTION INTRAMUSCULAR; INTRAVENOUS; SUBCUTANEOUS at 17:34

## 2019-12-13 RX ADMIN — SODIUM CHLORIDE, POTASSIUM CHLORIDE, SODIUM LACTATE AND CALCIUM CHLORIDE: 600; 310; 30; 20 INJECTION, SOLUTION INTRAVENOUS at 15:51

## 2019-12-13 RX ADMIN — LIDOCAINE HYDROCHLORIDE 100 MG: 20 INJECTION, SOLUTION INFILTRATION; PERINEURAL at 15:54

## 2019-12-13 RX ADMIN — PROPOFOL 25 MG: 10 INJECTION, EMULSION INTRAVENOUS at 16:41

## 2019-12-13 RX ADMIN — SODIUM CHLORIDE, POTASSIUM CHLORIDE, SODIUM LACTATE AND CALCIUM CHLORIDE: 600; 310; 30; 20 INJECTION, SOLUTION INTRAVENOUS at 15:07

## 2019-12-13 RX ADMIN — FENTANYL CITRATE 100 MCG: 50 INJECTION INTRAMUSCULAR; INTRAVENOUS at 15:54

## 2019-12-13 RX ADMIN — Medication 3 G: at 15:56

## 2019-12-13 RX ADMIN — SUGAMMADEX 400 MG: 100 INJECTION, SOLUTION INTRAVENOUS at 16:56

## 2019-12-13 ASSESSMENT — PULMONARY FUNCTION TESTS
PIF_VALUE: 21
PIF_VALUE: 2
PIF_VALUE: 21
PIF_VALUE: 2
PIF_VALUE: 21
PIF_VALUE: 20
PIF_VALUE: 20
PIF_VALUE: 22
PIF_VALUE: 2
PIF_VALUE: 22
PIF_VALUE: 21
PIF_VALUE: 2
PIF_VALUE: 20
PIF_VALUE: 21
PIF_VALUE: 36
PIF_VALUE: 8
PIF_VALUE: 2
PIF_VALUE: 0
PIF_VALUE: 7
PIF_VALUE: 8
PIF_VALUE: 20
PIF_VALUE: 1
PIF_VALUE: 21
PIF_VALUE: 20
PIF_VALUE: 3
PIF_VALUE: 20
PIF_VALUE: 20
PIF_VALUE: 23
PIF_VALUE: 21
PIF_VALUE: 20
PIF_VALUE: 0
PIF_VALUE: 7
PIF_VALUE: 21
PIF_VALUE: 20
PIF_VALUE: 2
PIF_VALUE: 36
PIF_VALUE: 21
PIF_VALUE: 20
PIF_VALUE: 20
PIF_VALUE: 1
PIF_VALUE: 21
PIF_VALUE: 15
PIF_VALUE: 1
PIF_VALUE: 20
PIF_VALUE: 21
PIF_VALUE: 21
PIF_VALUE: 20
PIF_VALUE: 21
PIF_VALUE: 21
PIF_VALUE: 20
PIF_VALUE: 30
PIF_VALUE: 11
PIF_VALUE: 21
PIF_VALUE: 20
PIF_VALUE: 1
PIF_VALUE: 21
PIF_VALUE: 20
PIF_VALUE: 7
PIF_VALUE: 25
PIF_VALUE: 20
PIF_VALUE: 25
PIF_VALUE: 21
PIF_VALUE: 35
PIF_VALUE: 21
PIF_VALUE: 20
PIF_VALUE: 20
PIF_VALUE: 30
PIF_VALUE: 3
PIF_VALUE: 21
PIF_VALUE: 20

## 2019-12-13 ASSESSMENT — PAIN - FUNCTIONAL ASSESSMENT
PAIN_FUNCTIONAL_ASSESSMENT: 0-10
PAIN_FUNCTIONAL_ASSESSMENT: 0-10

## 2019-12-13 ASSESSMENT — PAIN DESCRIPTION - LOCATION
LOCATION: ARM

## 2019-12-13 ASSESSMENT — PAIN SCALES - GENERAL
PAINLEVEL_OUTOF10: 8
PAINLEVEL_OUTOF10: 7

## 2019-12-13 ASSESSMENT — PAIN DESCRIPTION - PAIN TYPE
TYPE: SURGICAL PAIN

## 2019-12-13 ASSESSMENT — PAIN DESCRIPTION - ORIENTATION
ORIENTATION: RIGHT
ORIENTATION: RIGHT

## 2019-12-13 ASSESSMENT — ENCOUNTER SYMPTOMS: SHORTNESS OF BREATH: 1

## 2019-12-20 ENCOUNTER — HOSPITAL ENCOUNTER (OUTPATIENT)
Dept: OCCUPATIONAL THERAPY | Age: 44
Setting detail: THERAPIES SERIES
Discharge: HOME | End: 2019-12-20
Payer: COMMERCIAL

## 2019-12-20 PROCEDURE — 97110 THERAPEUTIC EXERCISES: CPT | Performed by: OCCUPATIONAL THERAPIST

## 2019-12-20 PROCEDURE — 97165 OT EVAL LOW COMPLEX 30 MIN: CPT | Performed by: OCCUPATIONAL THERAPIST

## 2019-12-20 PROCEDURE — 97112 NEUROMUSCULAR REEDUCATION: CPT | Performed by: OCCUPATIONAL THERAPIST

## 2019-12-23 DIAGNOSIS — S46.211A RUPTURE OF RIGHT DISTAL BICEPS TENDON, INITIAL ENCOUNTER: Primary | ICD-10-CM

## 2019-12-23 RX ORDER — OXYCODONE HYDROCHLORIDE AND ACETAMINOPHEN 5; 325 MG/1; MG/1
1 TABLET ORAL EVERY 6 HOURS PRN
Qty: 18 TABLET | Refills: 0 | Status: SHIPPED | OUTPATIENT
Start: 2019-12-23 | End: 2019-12-30

## 2019-12-24 ENCOUNTER — OFFICE VISIT (OUTPATIENT)
Dept: ORTHOPEDIC SURGERY | Age: 44
End: 2019-12-24
Payer: COMMERCIAL

## 2019-12-24 ENCOUNTER — TELEPHONE (OUTPATIENT)
Dept: ORTHOPEDIC SURGERY | Age: 44
End: 2019-12-24

## 2019-12-24 VITALS — BODY MASS INDEX: 42.94 KG/M2 | HEIGHT: 69 IN | WEIGHT: 289.9 LBS

## 2019-12-24 DIAGNOSIS — S46.211A RUPTURE OF RIGHT DISTAL BICEPS TENDON, INITIAL ENCOUNTER: Primary | ICD-10-CM

## 2019-12-24 PROCEDURE — L3760 EO ADJ JT PREFAB CUSTOM FIT: HCPCS | Performed by: ORTHOPAEDIC SURGERY

## 2019-12-24 PROCEDURE — 99024 POSTOP FOLLOW-UP VISIT: CPT | Performed by: ORTHOPAEDIC SURGERY

## 2020-01-02 RX ORDER — OXYCODONE HYDROCHLORIDE AND ACETAMINOPHEN 5; 325 MG/1; MG/1
1 TABLET ORAL EVERY 8 HOURS PRN
Qty: 18 TABLET | Refills: 0 | Status: SHIPPED | OUTPATIENT
Start: 2020-01-02 | End: 2020-01-09 | Stop reason: SDUPTHER

## 2020-01-02 NOTE — TELEPHONE ENCOUNTER
Patient called 12/31/19 requesting a pain medication refill. Patient left voicemail very upset that \"everyone decided to take vacation\" and that he's been \"out of medication since Sunday\" I called patient and reminded him of our narcotic policy and said I would ask another physician to refill, however I could not guarantee when it would be filled. No further questions. Last office visit 12/24/2019     Last written 12/23/19     Surgery 12/13/19 (2 WEEKS 6 DAYS)    Next office visit scheduled 1/14/2020    Requested Prescriptions     Pending Prescriptions Disp Refills    oxyCODONE-acetaminophen (PERCOCET) 5-325 MG per tablet  0     Sig: Take 1 tablet by mouth every 8 hours as needed for Pain for up to 7 days. Thank you!

## 2020-01-03 ENCOUNTER — HOSPITAL ENCOUNTER (OUTPATIENT)
Dept: OCCUPATIONAL THERAPY | Age: 45
Setting detail: THERAPIES SERIES
Discharge: HOME | End: 2020-01-03
Payer: COMMERCIAL

## 2020-01-03 PROCEDURE — 97140 MANUAL THERAPY 1/> REGIONS: CPT | Performed by: OCCUPATIONAL THERAPIST

## 2020-01-03 PROCEDURE — 97110 THERAPEUTIC EXERCISES: CPT | Performed by: OCCUPATIONAL THERAPIST

## 2020-01-03 PROCEDURE — 97112 NEUROMUSCULAR REEDUCATION: CPT | Performed by: OCCUPATIONAL THERAPIST

## 2020-01-03 PROCEDURE — 97035 APP MDLTY 1+ULTRASOUND EA 15: CPT | Performed by: OCCUPATIONAL THERAPIST

## 2020-01-03 NOTE — FLOWSHEET NOTE
Provided self-care/home management training related to activities of daily living and compensatory training, and/or use of adaptive equipment      Charges:  Timed Code Treatment Minutes: 38   Total Treatment Minutes: [facility]: Time In/Time Out     [] EVAL (LOW) 20837 (typically 20 minutes face-to-face)    [] EVAL (MOD) 60253 (typically 30 minutes face-to-face)  [] EVAL (HIGH) 56081 (typically 45 minutes face-to-face)  [] OT Re-eval (88457)       [x] Melissa [phone]) x   1   [] IQBNL(74507)  [x] NMR (92894) x 1   [] Estim (attended) (26253)   [] Manual ([id number]) x    [x] US (12302)  [] TA (95818) x      [] Paraffin (39484)  [] ADL  (54958) x     [] Splint/L code:    [] Estim (unattended) 33 [phone])  [] Fluidotherapy (06202)  [] Other:    ASSESSMENT:  Tolerates fair         Overall Progression Towards Functional Goals/Treatment Progress Update:  [x] Patient is progressing as expected towards functional goals listed. [] Progression is slowed due to complexities/impairments listed. [] Progression has been slowed due to co-morbidities.   [] Plan just implemented, too soon to assess goals progression <30 days  [] Goals require adjustment due to lack of progress  [] Patient is not progressing as expected and requires additional follow up with physician  [] All goals are met  [] Other:     Prognosis for POC: [x] Good [] Fair  [] Poor    Patient requires continued skilled intervention: [x] Yes  [] No    Treatment/Activity Tolerance:  [x] Patient able to complete treatment  [] Patient limited by fatigue  [] Patient limited by pain    [] Patient limited by other medical complications  [] Other:                  PLAN: See eval  [x] Continue per plan of care [] Alter current plan (see comments above)  [] Plan of care initiated [] Hold pending MD visit [] Discharge      Electronically signed by:  Sarwat Nieves OTMEET/L [id number]    Note: If patient does not return for scheduled/ recommended follow up visits, this note will serve as a

## 2020-01-09 RX ORDER — OXYCODONE HYDROCHLORIDE AND ACETAMINOPHEN 5; 325 MG/1; MG/1
1 TABLET ORAL EVERY 8 HOURS PRN
Qty: 18 TABLET | Refills: 0 | Status: SHIPPED | OUTPATIENT
Start: 2020-01-09 | End: 2020-01-16

## 2020-01-09 NOTE — TELEPHONE ENCOUNTER
Discussed narcotic policy again to patient. Informing him, that he needed to call me fore a refill. Patient agreed, no further questions. Last office visit 12/24/2019     Last written 1/2/2020     Surgery 12/13/19 (4 weeks)     Next office visit scheduled 1/14/2020    Requested Prescriptions     Pending Prescriptions Disp Refills    oxyCODONE-acetaminophen (PERCOCET) 5-325 MG per tablet 18 tablet 0     Sig: Take 1 tablet by mouth every 8 hours as needed for Pain for up to 7 days.

## 2020-01-14 ENCOUNTER — TELEPHONE (OUTPATIENT)
Dept: ORTHOPEDIC SURGERY | Age: 45
End: 2020-01-14

## 2020-01-17 ENCOUNTER — HOSPITAL ENCOUNTER (OUTPATIENT)
Dept: OCCUPATIONAL THERAPY | Age: 45
Setting detail: THERAPIES SERIES
Discharge: HOME | End: 2020-01-17
Payer: COMMERCIAL

## 2020-01-17 PROCEDURE — 97112 NEUROMUSCULAR REEDUCATION: CPT | Performed by: OCCUPATIONAL THERAPIST

## 2020-01-17 PROCEDURE — 97110 THERAPEUTIC EXERCISES: CPT | Performed by: OCCUPATIONAL THERAPIST

## 2020-01-17 PROCEDURE — 97035 APP MDLTY 1+ULTRASOUND EA 15: CPT | Performed by: OCCUPATIONAL THERAPIST

## 2020-01-17 NOTE — FLOWSHEET NOTE
Long MP:  PIP:  DIP:                               Ring MP:  PIP:  DIP:                               Small MP:  PIP:  DIP:    Digits tip to DPFC in cm Index:  Long:  Ring:  Small:    Thumb ROM MP  IP    Thumb opposition  R:  L:    Thumb Radial/Palmar abd ROM R:  L:    Wrist ROM Ext/Flex R: 60/45  L:    Rad/Uln dev ROM R:  L:    Forearm ROM  Sup/pron R: 35/60  L: Sup 60 pron WNL   Elbow ROM Ext/flex PROM flexion 90  L: WNL's    Shoulder Flex  Shoulder Abd  Shoulder IR/ER      Edema in cm circumf. MCPJs R:  L:    Edema in cm circumf. Wrist R: 18.5  L: 18     strength in lbs R:  L: R: 110#  L: 150#   Pinch Strengthin lbs: lat  R:  L:    Pinch Strength in lbs:  3 point R:  L:       MMT:       Observations:  (including splints, bandages, incisions, scars): Sutures in place       Sensation:  [x]? No reported deficits                        []? Intact to light touch               []? Court Bruins test completed, findings as noted:  []?  Other:            MODALITIES: 12/20/19  1/3/20  1/17/20    Fluidotherapy (92338)      Estim (44152/58859)      Paraffin (76793)      US (95749)  8' cont  8' cont    Iontophoresis (89365)      Hot Pack  8' 8'   Cold Pack            INTERVENTIONS:      Therapeutic Exercise (83483) Issued passive elbow flexion, AAROM wrist and FA see media for HEP     Education and precautions  AROM, elbow, FA, shoulder and wrist x 15 each    PROM forearm x 10  AROM elbow, FA     Finisher no weight x 5' Finisher 3# x 5'      putty 5' Power web pull and push gentle x 20               Therapeutic Activity (76128)                              Manual Therapy (22111)      (IASTM, Dry Needling, manual mobilization)            Neuromuscular Reeducation (79750) Cues with exercise and education and precautions  Cues for ex                ADL Training ([phone])                  HEP Training/Review See sheet(s)  Exercises  Seated Wrist Extension with Dumbbell - 10 reps - 2 sets - 1x daily - 7x weekly  Seated Wrist Flexion with Dumbbell - 10 reps - 2 sets - 1x daily - 7x weekly  Forearm Pronation and Supination with Hammer - 10 reps - 2 sets - 1x daily - 7x weekly    Added for home demo today 10 x 2                Splinting      Lcode:      Orthotic Mgmt, Subsequent Enc (64309)      Orthotic Mgmt & Training (83529)            Other:                                Therapeutic Exercise & NMR:  [x] (58595) Provided verbal/tactile cueing for activities related to strengthening, flexibility, endurance, ROM  for improvements in scapular, scapulothoracic and UE control with self care, reaching, carrying, lifting, house/yardwork, driving/computer work.    [] (78532) Provided verbal/tactile cueing for activities related to improving balance, coordination, kinesthetic sense, posture, motor skill, proprioception  to assist with  scapular, scapulothoracic and UE control with self care, reaching, carrying, lifting, house/yardwork, driving/computer work.     Therapeutic Activities & NMR:    [] (48293 or 27248) Provided verbal/tactile cueing for activities related to improving balance, coordination, kinesthetic sense, posture, motor skill, proprioception and motor activation to allow for proper function of scapular, scapulothoracic and UE control with self care, carrying, lifting, driving/computer work    Home Exercise Program:    [x] (44243) Reviewed/Progressed HEP activities related to strengthening, flexibility, endurance, ROM of scapular, scapulothoracic and UE control with self care, reaching, carrying, lifting, house/yardwork, driving/computer work  [] (10304) Reviewed/Progressed HEP activities related to improving balance, coordination, kinesthetic sense, posture, motor skill, proprioception of scapular, scapulothoracic and UE control with self care, reaching, carrying, lifting, house/yardwork, driving/computer work      Manual Treatments:  PROM / STM / Oscillations-Mobs:  G-I, II, III, IV (PA's, Inf., Post.)  [] (35198) Provided manual therapy to mobilize soft tissue/joints of cervical/CT, scapular GHJ and UE for the purpose of modulating pain, promoting relaxation,  increasing ROM, reducing/eliminating soft tissue swelling/inflammation/restriction, improving soft tissue extensibility and allowing for proper ROM for normal function with self care, reaching, carrying, lifting, house/yardwork, driving/computer work    ADL Training:  [] (85196) Provided self-care/home management training related to activities of daily living and compensatory training, and/or use of adaptive equipment      Charges:  Timed Code Treatment Minutes: 38   Total Treatment Minutes: 45   Worker's Comp: Time In/Time Out     [] EVAL (LOW) 94957 (typically 20 minutes face-to-face)    [] EVAL (MOD) 20377 (typically 30 minutes face-to-face)  [] EVAL (HIGH) 82218 (typically 45 minutes face-to-face)  [] OT Re-eval (44059)       [x] Melissa [phone]) x   1   [] FOYEB(34651)  [x] NMR (28434) x 1   [] Estim (attended) (90237)   [] Manual ([phone]) x    [x] US (47222)  [] TA (65536) x      [] Paraffin (70900)  [] ADL  (01098) x     [] Splint/L code:    [] Estim (unattended) [phone]  [] Fluidotherapy (47090)  [] Other:    ASSESSMENT:  Tolerating better than first session         Overall Progression Towards Functional Goals/Treatment Progress Update:  [x] Patient is progressing as expected towards functional goals listed. [] Progression is slowed due to complexities/impairments listed. [] Progression has been slowed due to co-morbidities.   [] Plan just implemented, too soon to assess goals progression <30 days  [] Goals require adjustment due to lack of progress  [] Patient is not progressing as expected and requires additional follow up with physician  [] All goals are met  [] Other:     Prognosis for POC: [x] Good [] Fair  [] Poor    Patient requires continued skilled intervention: [x] Yes  [] No    Treatment/Activity Tolerance:  [x] Patient able to complete treatment  [] Patient

## 2020-01-21 ENCOUNTER — OFFICE VISIT (OUTPATIENT)
Dept: ORTHOPEDIC SURGERY | Age: 45
End: 2020-01-21

## 2020-01-21 VITALS — HEIGHT: 69 IN | BODY MASS INDEX: 42.94 KG/M2 | WEIGHT: 289.9 LBS

## 2020-01-21 PROCEDURE — 99024 POSTOP FOLLOW-UP VISIT: CPT | Performed by: ORTHOPAEDIC SURGERY

## 2020-01-21 RX ORDER — DICLOFENAC SODIUM 75 MG/1
75 TABLET, DELAYED RELEASE ORAL 2 TIMES DAILY
Qty: 60 TABLET | Refills: 0 | Status: SHIPPED | OUTPATIENT
Start: 2020-01-21 | End: 2020-02-22 | Stop reason: SDUPTHER

## 2020-01-21 NOTE — PROGRESS NOTES
Surgery: Distal biceps repair    Post-Op Week:  5    Chief Complaint:  Post-Op Check (right elbow)      History of Present of Illness: Overall Yara Lopez is doing quite well. He still has some odd posterior lateral soreness that is difficult to explain. No other mechanical problems    Review of Systems  Pertinent items are noted in HPI  Denies fever, chills, confusion, bowel/bladder active change. Review of systems reviewed from Patient History Form dated on January 21, 2020 and available in the patient's chart under the Media tab. Examination:  On exam today his incisions well-healed. He has normal distal neurovascular function, full extension, 45 degrees of supination, palpably intact tendon with good muscle contraction    Radiology:     None today    No orders of the defined types were placed in this encounter. Impression:  Healing distal biceps repair      Treatment Plan:  Overall I think he is making good progress. Continue some supination stretching. We will now introduce some light strengthening work at the 6-week isacc. We discussed appropriate use and precautions in the work environment. We will need to keep an eye on this posterior lateral pain. Is unclear what the etiology is. He did have x-rays showing reasonable placement of the button. He has perhaps a reflexive tendinopathy, nonetheless I am hopeful that as he removes the brace and does more work this will resolve with time. We will see him back in 6 weeks. [facility]ed with [facility]     This dictation was performed with a verbal recognition program (DRAGON) and it was checked for errors. It is possible that there are still dictated errors within this office note. If so, please bring any errors to my attention for an addendum. All efforts were made to ensure that this office note is accurate.

## 2020-01-28 ENCOUNTER — OFFICE VISIT (OUTPATIENT)
Dept: ORTHOPEDIC SURGERY | Age: 45
End: 2020-01-28
Payer: COMMERCIAL

## 2020-01-28 VITALS — HEIGHT: 69 IN | BODY MASS INDEX: 42.94 KG/M2 | WEIGHT: 289.9 LBS

## 2020-01-28 PROCEDURE — 99213 OFFICE O/P EST LOW 20 MIN: CPT | Performed by: ORTHOPAEDIC SURGERY

## 2020-01-30 ENCOUNTER — TELEPHONE (OUTPATIENT)
Dept: ORTHOPEDIC SURGERY | Age: 45
End: 2020-01-30

## 2020-01-30 RX ORDER — TRAMADOL HYDROCHLORIDE 50 MG/1
50 TABLET ORAL EVERY 8 HOURS PRN
Qty: 18 TABLET | Refills: 0 | Status: SHIPPED | OUTPATIENT
Start: 2020-01-30 | End: 2020-02-04 | Stop reason: SDUPTHER

## 2020-01-30 NOTE — TELEPHONE ENCOUNTER
Patient left  requesting something stronger than diclofenac for pain because he is \"tired of suffering. \"      I spoke to Dr. Allison Klein and he said he would only be able to send in Tramadol at this time. Pended    Requested Prescriptions     Pending Prescriptions Disp Refills    traMADol (ULTRAM) 50 MG tablet  0     Sig: Take 1 tablet by mouth every 8 hours as needed for Pain for up to 7 days.

## 2020-02-04 ENCOUNTER — TELEPHONE (OUTPATIENT)
Dept: ORTHOPEDIC SURGERY | Age: 45
End: 2020-02-04

## 2020-02-04 ENCOUNTER — HOSPITAL ENCOUNTER (OUTPATIENT)
Dept: OCCUPATIONAL THERAPY | Age: 45
Setting detail: THERAPIES SERIES
Discharge: HOME | End: 2020-02-04
Payer: COMMERCIAL

## 2020-02-04 RX ORDER — TRAMADOL HYDROCHLORIDE 50 MG/1
50 TABLET ORAL EVERY 8 HOURS PRN
Qty: 18 TABLET | Refills: 0 | Status: SHIPPED | OUTPATIENT
Start: 2020-02-06 | End: 2020-02-13

## 2020-02-04 NOTE — FLOWSHEET NOTE
[facility] [address] [facility],  [address]  Phone: [phone]  Fax [phone]      Occupational Therapy  Cancellation/No-show Note  Patient Name:  Sherrie Toure   :  1975   Date:  2020  Cancelled visits to date: 1  No-shows to date: 0    For today's appointment patient:  [x]    Cancelled  []    Rescheduled appointment  []    No-show     Reason given by patient:  []    Patient ill  []    Conflicting appointment  []    No transportation    []    Conflict with work  []    No reason given  [x]    Other:  Getting and MRI   Comments:      Electronically signed by:  Richard Medina, OT

## 2020-02-04 NOTE — TELEPHONE ENCOUNTER
Last office visit 1/28/2020     Last written 1/30/2020     Surgery 12/13/2020     Next office visit scheduled 3/3/2020    Requested Prescriptions     Pending Prescriptions Disp Refills    traMADol (ULTRAM) 50 MG tablet 18 tablet 0     Sig: Take 1 tablet by mouth every 8 hours as needed for Pain for up to 7 days.

## 2020-02-13 ENCOUNTER — TELEPHONE (OUTPATIENT)
Dept: ORTHOPEDIC SURGERY | Age: 45
End: 2020-02-13

## 2020-02-13 NOTE — TELEPHONE ENCOUNTER
RETURNED PATIENTS PHONE CALL - PATIENT SCHEDULED FOR 2/18/2020 TO REVIEW MRI AND DISCUSS MEDICATION    PATIENT BEGAN TO VENT HIS FRUSTRATIONS OF HIS SITUATION REGARDING THE PAIN HE IS HAVING BUT QUICKLY STOPPED AND SAID HE WOULD DISCUSS WITH DR. Jeferson Bennett.

## 2020-02-18 ENCOUNTER — HOSPITAL ENCOUNTER (OUTPATIENT)
Dept: OCCUPATIONAL THERAPY | Age: 45
Setting detail: THERAPIES SERIES
Discharge: HOME | End: 2020-02-18
Payer: COMMERCIAL

## 2020-02-18 ENCOUNTER — OFFICE VISIT (OUTPATIENT)
Dept: ORTHOPEDIC SURGERY | Age: 45
End: 2020-02-18

## 2020-02-18 VITALS — BODY MASS INDEX: 42.94 KG/M2 | WEIGHT: 289.9 LBS | HEIGHT: 69 IN

## 2020-02-18 PROCEDURE — 99024 POSTOP FOLLOW-UP VISIT: CPT | Performed by: ORTHOPAEDIC SURGERY

## 2020-02-18 NOTE — PROGRESS NOTES
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 He was seen mid December with an acute and traumatic complete rupture of the distal biceps tendon with slight retraction. Based upon this presentation he was taken to surgery on December 13 for the above-mentioned procedure. During the postoperative process he did quite well with regard to his distal biceps repair the began complaining of posterior lateral elbow pain. It is notable that he returned to work quite quickly with some reasonably high level lifting and activity demands in the presence of his a healing distal biceps tendon. X-rays were obtained which found the repair tunnel slightly proximal but the button intact. At 6 weeks he was quite frustrated that he did not have complete and full use of the elbow. He is not able to perform his job and was having more posterior lateral pain with gripping. We ordered an MRI to document the integrity of the repair as well as to evaluate any other structural pathology posterior laterally that may be causing or representative of his symptoms such as synovitic change, loose body, extensor tendon damage    He is in today and feels that he might actually be slightly worse with regard to the posterior lateral pain. He has trouble even gripping a coffee cup. Pain is worse when he releases . Symptoms are activity related. He has no radial nerve symptoms. Review of Systems  Pertinent items are noted in HPI  Denies fever, chills, confusion, bowel/bladder active change. Review of systems reviewed from Patient History Form dated on February 18 on exam today I's clearly a frustrated with his body language. And available in the patient's chart under the Media tab.      Examination:  He has full extension, he is absent

## 2020-02-24 RX ORDER — DICLOFENAC SODIUM 75 MG/1
75 TABLET, DELAYED RELEASE ORAL 2 TIMES DAILY
Qty: 60 TABLET | Refills: 0 | Status: SHIPPED
Start: 2020-02-24 | End: 2020-03-13 | Stop reason: ALTCHOICE

## 2020-03-03 ENCOUNTER — OFFICE VISIT (OUTPATIENT)
Dept: ORTHOPEDIC SURGERY | Age: 45
End: 2020-03-03
Payer: COMMERCIAL

## 2020-03-03 VITALS — RESPIRATION RATE: 12 BRPM | WEIGHT: 289.9 LBS | BODY MASS INDEX: 42.94 KG/M2 | HEIGHT: 69 IN

## 2020-03-03 PROCEDURE — 99213 OFFICE O/P EST LOW 20 MIN: CPT | Performed by: ORTHOPAEDIC SURGERY

## 2020-03-03 PROCEDURE — 20551 NJX 1 TENDON ORIGIN/INSJ: CPT | Performed by: ORTHOPAEDIC SURGERY

## 2020-03-03 RX ORDER — TRIAMCINOLONE ACETONIDE 40 MG/ML
80 INJECTION, SUSPENSION INTRA-ARTICULAR; INTRAMUSCULAR ONCE
Status: COMPLETED | OUTPATIENT
Start: 2020-03-03 | End: 2020-03-03

## 2020-03-03 RX ORDER — BUPIVACAINE HYDROCHLORIDE 2.5 MG/ML
2 INJECTION, SOLUTION INFILTRATION; PERINEURAL ONCE
Status: CANCELLED | OUTPATIENT
Start: 2020-03-03 | End: 2020-03-03

## 2020-03-03 RX ORDER — BUPIVACAINE HYDROCHLORIDE 2.5 MG/ML
2 INJECTION, SOLUTION INFILTRATION; PERINEURAL ONCE
Status: COMPLETED | OUTPATIENT
Start: 2020-03-03 | End: 2020-03-03

## 2020-03-03 RX ADMIN — BUPIVACAINE HYDROCHLORIDE 5 MG: 2.5 INJECTION, SOLUTION INFILTRATION; PERINEURAL at 15:37

## 2020-03-03 RX ADMIN — TRIAMCINOLONE ACETONIDE 80 MG: 40 INJECTION, SUSPENSION INTRA-ARTICULAR; INTRAMUSCULAR at 15:38

## 2020-03-03 NOTE — PROGRESS NOTES
Department of Orthopedic Surgery   Progress Note      Follow-Up: Lateral epicondylitis right elbow    Subjective:     Leatha Marion is a very pleasant 49-year-old who underwent a anterior single incision distal biceps repair. He seemed to do quite well after surgery with regard to the distal biceps repair but began to develop worsening pattern of posterior lateral elbow pain. It is notable that he returned to work very quickly and as such may have been compensating for his distal biceps and in many different ways. Pain is been getting much worse and as such we obtained a postoperative MRI to ensure no complications. This revealed no lateral sided or posterior lateral structural anatomic pathology. He did obtain a second opinion that recommended additional time and rehabilitation. My follow-up exams have pointed to lateral epicondylitis as a potential cause of his pain. Significant pain with gripping and wrist extension. Pain radiating into the common extensor group. Tenderness at the lateral epicondyle have all been hallmarks of his presentations. Is here to discuss care and consider injection      Objective:     @IPVITALS(24)@    Review of Systems  Pertinent items are noted in HPI  Denies fever, chills, confusion, bowel/bladder active change. Review of systems reviewed from Patient History Form dated on March 3 and available in the patient's chart under the Media tab. Pain Assessment  Location of Pain: Elbow  Location Modifiers: Right  Severity of Pain: 8  Quality of Pain: Sharp  Duration of Pain: A few minutes  Frequency of Pain: Intermittent  Aggravating Factors: Other (Comment)(grabbing, wrist extension.)  Limiting Behavior: Yes  Relieving Factors: Rest    Exam: On exam today he is actually noting some significant posterior and lateral shoulder pain. Pain with resisted external rotation and abduction.     He has some generalized pain over the lateral elbow although most prominently over the

## 2020-03-12 ENCOUNTER — TELEPHONE (OUTPATIENT)
Dept: PAIN MANAGEMENT | Age: 45
End: 2020-03-12

## 2020-03-12 NOTE — TELEPHONE ENCOUNTER
[address]    Screening Questionnaire     Name of current [facility] PCP (per patient): Dr. Kaitlynn Valente   Referring diagnosis: Rt elbow     1. Name of last Pain provider: Pt states that he has never seen PM before. 2. When were you last seen by this Pain provider: N/A   3. Last time you had MRI/XRays done for your pain: Xray-12/2019    Report available?: Yes  4. Are you taking any opioids at this time:  No   Name of medication: N/A   5. Are you under opioid contract with your current provider:  No   Last date medication filled: N/A   6. Reason for switch:    - Were you discharged?:  N/A    - Other Reason: N/A      We need the last 3 office notes and MRI reports (within past 5 years), if any    available, before being seen    PLEASE READ FOLLOWING INFORMATION TO PATIENTS:     - We are a Comprehensive Pain Management program.   - Prior pain medications may be changed, based on our evaluation.   - We may require Behavioral Health consultation with Pain Psychologist (Dr Sissy Fernandes) at the time of initial evaluation. If done so, there may be a separate charge for the psychology services. Please allow additional 30 minutes to complete this evaluation.     - You need a CURRENT [facility] provider.    (check with the referring provider's office to confirm). IF OLD RECORDS (INCLUDING MRI REPORT) NOT RECEIVED WITHIN 2 WEEKS, WE MAY SEND REFERRAL BACK TO REFERRING PROVIDER. We ask that you bring your Photo ID, Insurance card and any co-payments that are due at the time of your services. Any unresolved questions, please refer to the Provider for approval.    Approved for Consult:   Yes

## 2020-03-13 ENCOUNTER — OFFICE VISIT (OUTPATIENT)
Dept: PAIN MANAGEMENT | Age: 45
End: 2020-03-13
Payer: COMMERCIAL

## 2020-03-13 VITALS
DIASTOLIC BLOOD PRESSURE: 96 MMHG | HEIGHT: 69 IN | SYSTOLIC BLOOD PRESSURE: 166 MMHG | WEIGHT: 277.8 LBS | BODY MASS INDEX: 41.15 KG/M2 | HEART RATE: 63 BPM

## 2020-03-13 PROCEDURE — 99204 OFFICE O/P NEW MOD 45 MIN: CPT | Performed by: ANESTHESIOLOGY

## 2020-03-13 PROCEDURE — G0444 DEPRESSION SCREEN ANNUAL: HCPCS | Performed by: ANESTHESIOLOGY

## 2020-03-13 RX ORDER — BACLOFEN 10 MG/1
10 TABLET ORAL 3 TIMES DAILY PRN
Qty: 90 TABLET | Refills: 0 | Status: SHIPPED
Start: 2020-03-13 | End: 2020-04-08

## 2020-03-13 RX ORDER — AMITRIPTYLINE HYDROCHLORIDE 10 MG/1
10 TABLET, FILM COATED ORAL NIGHTLY
Qty: 30 TABLET | Refills: 0 | Status: SHIPPED
Start: 2020-03-13 | End: 2020-04-08

## 2020-03-13 ASSESSMENT — ANXIETY QUESTIONNAIRES
6. BECOMING EASILY ANNOYED OR IRRITABLE: 2-OVER HALF THE DAYS
3. WORRYING TOO MUCH ABOUT DIFFERENT THINGS: 0-NOT AT ALL
4. TROUBLE RELAXING: 3-NEARLY EVERY DAY
2. NOT BEING ABLE TO STOP OR CONTROL WORRYING: 2-OVER HALF THE DAYS
5. BEING SO RESTLESS THAT IT IS HARD TO SIT STILL: 0-NOT AT ALL
7. FEELING AFRAID AS IF SOMETHING AWFUL MIGHT HAPPEN: 0-NOT AT ALL
GAD7 TOTAL SCORE: 9
1. FEELING NERVOUS, ANXIOUS, OR ON EDGE: 2-OVER HALF THE DAYS

## 2020-03-13 ASSESSMENT — ENCOUNTER SYMPTOMS
EYE PAIN: 0
EYE DISCHARGE: 0
BACK PAIN: 0
COUGH: 0
VOMITING: 0
EYE REDNESS: 0
NAUSEA: 0
WHEEZING: 0
SHORTNESS OF BREATH: 0
CONSTIPATION: 0
ABDOMINAL PAIN: 0

## 2020-03-13 ASSESSMENT — PATIENT HEALTH QUESTIONNAIRE - PHQ9
4. FEELING TIRED OR HAVING LITTLE ENERGY: 3
SUM OF ALL RESPONSES TO PHQ QUESTIONS 1-9: 18
5. POOR APPETITE OR OVEREATING: 2
SUM OF ALL RESPONSES TO PHQ QUESTIONS 1-9: 18
8. MOVING OR SPEAKING SO SLOWLY THAT OTHER PEOPLE COULD HAVE NOTICED. OR THE OPPOSITE, BEING SO FIGETY OR RESTLESS THAT YOU HAVE BEEN MOVING AROUND A LOT MORE THAN USUAL: 0
SUM OF ALL RESPONSES TO PHQ9 QUESTIONS 1 & 2: 5
6. FEELING BAD ABOUT YOURSELF - OR THAT YOU ARE A FAILURE OR HAVE LET YOURSELF OR YOUR FAMILY DOWN: 3
2. FEELING DOWN, DEPRESSED OR HOPELESS: 3
7. TROUBLE CONCENTRATING ON THINGS, SUCH AS READING THE NEWSPAPER OR WATCHING TELEVISION: 2
1. LITTLE INTEREST OR PLEASURE IN DOING THINGS: 2
3. TROUBLE FALLING OR STAYING ASLEEP: 3
9. THOUGHTS THAT YOU WOULD BE BETTER OFF DEAD, OR OF HURTING YOURSELF: 0

## 2020-03-13 NOTE — PROGRESS NOTES
[facility]  [address], [place], [address]  [phone] (O)  [phone] (f)      03/13/20      Darline Gr  1975      Maycol Ingram MD  [facility]      Reason for Referral:  \"right arm pain\"    Chief Complaint:   Chief Complaint   Patient presents with    Elbow Injury     Pt complains of right elbow pain that radiates down his arm       History of Present Illness   HPI    PATIENT HISTORY    Presents with chronic pain in right arm since injury after lifting heavy dry wall during 12/2019. He followed with [facility]THO and was found to have traumatic rupture of right distal biceps tendon, and had surgical repair (Dr Surya Noble) done in 12/2019 with possible nerve block for the surgery. Reports he 'woke up' with pain in right elbow, that has not let up since then. He followed with the surgeon, tried PT without much relief in pain along the lateral aspect of elbow. He recently tried injection for lateral epicondylitis of right elbow with minimal help. Persistent pain along the lateral aspect of right elbow, while the pain from biceps injury is minimal - referred to us for further options. Seen [facility] (Dr Nicole Barnett) for polyarthropathy, and tried injection to the knees without help. He has also seen Cardiology for non-specific pain and tiredness approximately 2 years ago. Currently on [place] [facility] for arthritis for the past year with moderate help. History significant for - Arthritis, fatty liver, depression/anxiety. 1. Pain Characteristics:    Location of Pain: Lateral aspect of right elbow. Quality of Pain: sharp, stabbing. Frequency of Pain: constant episodically worse. Radiation: down the arm. Tingling/numbness: none. Weakness: none. Aggravated by: any activity. Relieved by: nothing. Other: as above.     RED FLAG symptoms (Symptoms may include, but notlimited to, acute trauma, fever, drug use, malignancy, weakness, perianal numbness, bladder/bowel changes) - No     2. Therapies Tried:    Chiropractic Manipulation: No / N/A   Physical Therapy: Yes / N/A - last in 02/2020   Home Exercise: Yes / N/A   TENS Therapy: No / N/A   Psychotherapy: No / N/A   Injections: Yes / N/A - lateral epicondyle injection by [facility]THO. Surgery: Yes / right biceps tendon repair in 12/2019   Other: as above. 3. Pain Medications Tried:    NSAIDS: Yes / N/A - ibuprofen, dicloenac   Antidepressants/Anxiolytics: No / N/A   Anticonvulsants: No / N/A   Muscle Relaxants: Yes / N/A - Robaxin   Opioids: None Recently / N/A - Ultram, Percocet PRN    4. Co-existing Conditions:    History reviewed. No pertinent past medical history. Past Surgical History:   Procedure Laterality Date    BICEPS TENDON REPAIR Right 12/13/2019    RIGHT ELBOW OPEN DISTAL BICEP REPAIR WITH BLOCK FOR PAIN CONTROL       ARTHREX performed by Joselin Farris MD at [facility]       No Known Allergies    Current Outpatient Medications   Medication Sig Dispense Refill    baclofen (LIORESAL) 10 MG tablet Take 1 tablet by mouth 3 times daily as needed (musculoskeletal pain) 90 tablet 0    diclofenac sodium (VOLTAREN) 1 % GEL Apply 4 g topically 3 times daily as needed for Pain 4 Tube 1    amitriptyline (ELAVIL) 10 MG tablet Take 1 tablet by mouth nightly 30 tablet 0    Elastic Bandages & Supports (KNEE BRACE) MISC Use in each knee 2 each 1    acetaminophen (TYLENOL) 325 MG tablet Take 1,000 mg by mouth every 6 hours as needed for Pain      ranitidine (ZANTAC) 150 MG tablet Take 1 tablet by mouth 2 times daily (Patient taking differently: Take 150 mg by mouth 2 times daily as needed ) 60 tablet 3     No current facility-administered medications for this visit.         Family History   Problem Relation Age of Onset    Heart Disease Mother         MI    Heart Disease Paternal Grandfather         MI       Social History     Socioeconomic History    Marital status:      Spouse name: Not on file    Number of children: Not on file    Years of education: Not on file    Highest education level: Not on file   Occupational History    Not on file   Social Needs    Financial resource strain: Not on file    Food insecurity     Worry: Not on file     Inability: Not on file    Transportation needs     Medical: Not on file     Non-medical: Not on file   Tobacco Use    Smoking status: Former Smoker     Last attempt to quit: 2014     Years since quittin.2    Smokeless tobacco: Never Used   Substance and Sexual Activity    Alcohol use: No    Drug use: Yes     Types: Marijuana     Comment: PAIN    Sexual activity: Not on file   Lifestyle    Physical activity     Days per week: Not on file     Minutes per session: Not on file    Stress: Not on file   Relationships    Social connections     Talks on phone: Not on file     Gets together: Not on file     Attends [employer] service: Not on file     Active member of club or organization: Not on file     Attends meetings of clubs or organizations: Not on file     Relationship status: Not on file    Intimate partner violence     Fear of current or ex partner: Not on file     Emotionally abused: Not on file     Physically abused: Not on file     Forced sexual activity: Not on file   Other Topics Concern    Not on file   Social History Narrative    Not on file       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 Currently Employed: Yes  Any pendinglawsuits for pain: No  Disability: No    Substance Use History:  History of Substance Abuse: Yes - recreational THC. Ongoing: Yes    5. Imaging Studies:   MRI Right arm (2020)  \"CONCLUSION:   1. Status post biceps tendon repair without evidence of re-tear. 2. Triceps tendon is intact.  No evidence of olecranon bursitis. \"     Results of the above studies were personally reviewed by me with the patient in detail along with the images, if available.  [facility] other unrelated findings not addressed during today's visit. PHYSICALEXAMINATION    1. Review of Systems:   Review of Systems   Constitutional: Negative for chills and fever. HENT: Negative for hearing loss and tinnitus. Eyes: Negative for pain, discharge and redness. Respiratory: Negative for cough, shortness of breath and wheezing. Cardiovascular: Negative for chest pain and palpitations. Gastrointestinal: Negative for abdominal pain, constipation, nausea and vomiting. Genitourinary: Negative for frequency, hematuria and urgency. Musculoskeletal: Positive for arthralgias and joint swelling. Negative for back pain, myalgias and neck pain. Skin: Negative for rash. Neurological: Negative for dizziness, seizures, weakness and headaches. Hematological: Does not bruise/bleed easily. Psychiatric/Behavioral: Negative for suicidal ideas. The patient is not nervous/anxious. The patient was instructed to contact primary care physician regarding ROS positives not being addressed during today's visit. 2. Physical Exam:   Physical Exam  Constitutional:       General: He is not in acute distress. Appearance: He is not diaphoretic. Comments: Ambulates without help   HENT:      Head: Normocephalic. Eyes:      Pupils: Pupils are equal, round, and reactive to light. Neck:      Musculoskeletal: Normal range of motion and neck supple. Thyroid: No thyromegaly. Cardiovascular:      Rate and Rhythm: Normal rate and regular rhythm. Heart sounds: Normal heart sounds. Pulmonary:      Effort: Pulmonary effort is normal. No respiratory distress. Breath sounds: Normal breath sounds. Chest:      Chest wall: No tenderness. Abdominal:      General: Bowel sounds are normal.      Palpations: Abdomen is soft. There is no mass. Tenderness: There is no abdominal tenderness. There is no guarding.    Musculoskeletal: Normal range of motion. General: No tenderness or deformity. Comments: Tenderness along the extensor musculature distal to the lateral epicondyle of right elbow. No focal tenderness in the lateral epicondyle. Painful on deep pressure with flexion/extension of fingers. No swelling, no color change, no allodynia or hyperesthesia along the elbow. Lymphadenopathy:      Cervical: No cervical adenopathy. Skin:     General: Skin is warm. Findings: No rash. Neurological:      Mental Status: He is alert and oriented to person, place, and time. Cranial Nerves: No cranial nerve deficit. Sensory: No sensory deficit. Motor: No abnormal muscle tone. Coordination: Coordination normal.      Gait: Gait normal.      Deep Tendon Reflexes: Reflexes normal. Babinski sign absent on the right side. Babinski sign absent on the left side. Reflex Scores:       Tricep reflexes are 1+ on the right side and 1+ on the left side. Bicep reflexes are 1+ on the right side and 1+ on the left side. Brachioradialis reflexes are 1+ on the right side and 1+ on the left side. Patellar reflexes are 1+ on the right side and 1+ on the left side. Achilles reflexes are 1+ on the right side and 1+ on the left side. Psychiatric:         Behavior: Behavior normal.         Thought Content: Thought content normal.         Vitals:    03/13/20 0959 03/13/20 1001   BP: (!) 157/93 (!) 166/96   Site: Left Upper Arm Left Lower Arm   Position: Sitting Sitting   Cuff Size: Large Adult Medium Adult   Pulse: 67 63   Weight: 277 lb 12.8 oz (126 kg)    Height: 5' 9\" (1.753 m)        Body mass index is 41.02 kg/m². Pain Score:   8 /10    3.  Functional Assessment:     Brief Pain Inventory (BPI)   Pain Score = 8/10   Interference Score = 8 /10    Pain, Enjoyment, General Activity (PEG-3)    Score = 8 /10    Pain Disability Index ([place])    Score = 45 /70      Patient Health Questionnaire-9 (PHQ-9)    Score = 18 /27      General Anxiety Disorder-7 (ZARI-7)    Score = 9 /21     Pain Catastrophizing Scale (PCS)    Score = 35 /52    ASSESSMENT      ICD-10-CM    1. Elbow pain, chronic, right [address], [place], [place], Orthopedic Surgery, [facility]    G89.29 baclofen (LIORESAL) 10 MG tablet     diclofenac sodium (VOLTAREN) 1 % GEL     amitriptyline (ELAVIL) 10 MG tablet   2. Chronic musculoskeletal pain M79.18 baclofen (LIORESAL) 10 MG tablet    G89.29    3. Polyarthropathy, multiple sites M13.0 baclofen (LIORESAL) 10 MG tablet   4. Chronic pain syndrome G89.4 baclofen (LIORESAL) 10 MG tablet     amitriptyline (ELAVIL) 10 MG tablet   5. Pain disorder with psychological factors F45.41          TREATMENT PLAN    1. Goals:     Multidisciplinary comprehensive pain management with functional improvement. 2. Plan of Care Recommendations:     History of right bicipital tendon injury and repair in 12/2019 - improved. He complains of a different pain along the lateral aspect of right elbow since the surgery. Physical exam showed tenderness along the extensor musculature distal to the lateral epicondyle. No overt signs of swelling color change allodynia/hyperesthesia to indicate CRPS. Recommend Second Opinion with [facility]THO; pain is different from the injury of 12/2019. I will provide Voltaren gel, baclofen and trial amitriptyline at night for chronic pain and sleep. Encouraged home exercises and try a tennis elbow brace for support. Follow with us after the second opinion for further options. Additional imaging/referrals: [facility]TH[facility] for second opinion. Physical therapy/Home exercises: continue home exercises. Psychological: consider chronic pain counseling in future. Interventions: NA    Adjuvant medications: Voltaren gel, baclofen, amitriptyline.     Prescription pain medications: NA       Current MEDD = 0; on [employer]    ORT (Opioid Risk Tool) Score = 1  LOW    Other Risk factors - Depression, recreational THC. UDT (Urine Drug Test) - NA    Pain Medication Agreement - NA      Controlled Substances Monitoring:   Periodic Controlled Substance Monitoring: Assessed functional status. Georgette Nicole MD)    - Reviewed OARRS report in detail, including Narx Scores and OverdoseRisk Score. 3. Education:     - Educational material provided on multidisciplinary chronic pain management. - Encouraged regular home exercises, and strengtheningas long-term goals. - Counseled on role and limitations of medications and injections.  - Educational[facility] from [facility] on  'Non-opioid treatments for chronic pain', and tennis elbow provided. 4. Follow-up: RTC X 1 month. The entire treatment plan was discussed with patient and agreed. Thank you for the referral - please do not hesitate to contact us if you have any questions or concerns.             Kaylee Brown MD  Board Certified in Anesthesiology and Pain Medicine    Cc:  Joelle De MD  [place] [address]  [phone]

## 2020-03-13 NOTE — PATIENT INSTRUCTIONS
Patient Education     I have referred you to [facility] for second opinion. I have provided diclofenac cream for topical use  I have provided baclofen as needed, and amitriptyline at night  May use a 'tennis elbow brace' for support    Tennis Elbow: Care Instructions  Your Care Instructions    Tennis elbow is soreness or pain on the outer part of the elbow. The pain occurs when the tendon is stretched and becomes irritated by repeated twisting of the hand, wrist, and forearm. A tendon is a tough tissue that connects muscle to bone. This injury is common in tennis players. But you also can get it from many activities that work the same muscles. Examples include gardening, painting, and using tools. Tennis elbow usually heals with rest and treatment at home. Follow-up care is a key part of your treatment and safety. Be sure to make and go to all appointments, and call your doctor if you are having problems. It's also a good idea to know your test results and keep a list of the medicines you take. How can you care for yourself at home?    · Rest your fingers, wrist, and forearm. Try to stop or reduce any activity that causes elbow pain. You may have to rest your arm for weeks to months. Follow your doctor's directions for how long to rest.     · Put ice or a cold pack on your elbow for 10 to 20 minutes at a time. Try to do this every 1 to 2 hours for the next 3 days (when you are awake) or until the swelling goes down. Put a thin cloth between the ice and your skin.     · If your doctor gave you a brace or splint, use it as directed. A \"counterforce\" brace is a strap around your forearm, just below your elbow. It may ease the pressure on the tendon and spread force throughout your arm.     · Prop up your elbow on pillows to help reduce swelling.     · Follow your doctor's or physical therapist's directions for exercise.     · Return to your usual activities slowly.     · Try to prevent the problem.  Learn the best

## 2020-04-08 ENCOUNTER — TELEMEDICINE (OUTPATIENT)
Dept: PAIN MANAGEMENT | Age: 45
End: 2020-04-08
Payer: COMMERCIAL

## 2020-04-08 PROCEDURE — 99442 PR PHYS/QHP TELEPHONE EVALUATION 11-20 MIN: CPT | Performed by: ANESTHESIOLOGY

## 2020-04-08 NOTE — PROGRESS NOTES
G89.29 HYDROcodone-acetaminophen (NORCO) 5-325 MG per tablet   2. Chronic musculoskeletal pain M79.18 tiZANidine (ZANAFLEX) 4 MG tablet    G89.29    3. Chronic pain syndrome G89.4 tiZANidine (ZANAFLEX) 4 MG tablet       Plan:     1. Chronic pain along lateral aspect of right elbow since injury in 12/2019.  2. He was unable to get into [facility] for second opinion due to [address] outbreak  3. Reports he was intolerant of NSAIDs, baclofen and amitriptyline and stopped all of them. 4. Encouraged to use elbow brace and local heat ; may trial tizanidine to help with pain and spasm. 5. He is rquesting other pain medication than NSAIDs; counseled on limitation of opioid. 6. He is already on [employer] program for arthritis. Informed that we do not prescribe chronic opioids while on this program.  7. He was frustrated with the above policy - I will provide Norco 5 mg qhs PRN for short-term use only. Analgesic Plan:   Continue present regimen: No   Adjust dose of present analgesic: No   Switch analgesics: Yes - Norco 5 mg qhs PRN   Add/Adjust concomitant therapy: Yes - tizanidine (stop baclofen, amitriptyline)    Follow up:    RTC X 1 month      Documentation:    Details of this discussion including any medical advice provided: as above. I affirm this is a Patient Initiated Episode with an Established Patient who has not had a related appointment within my department in the past 7 days or scheduled within the next 24 hours. Total Time: minutes: 11-20 minutes    This visit was completed VIRTUALLY using Phone encounter.                Bubba Heard MD  Board Certified in Anesthesiology and Pain Medicine

## 2020-04-09 RX ORDER — TIZANIDINE 4 MG/1
4 TABLET ORAL 2 TIMES DAILY PRN
Qty: 60 TABLET | Refills: 0 | Status: SHIPPED
Start: 2020-04-09 | End: 2020-05-12 | Stop reason: SINTOL

## 2020-04-09 RX ORDER — HYDROCODONE BITARTRATE AND ACETAMINOPHEN 5; 325 MG/1; MG/1
1 TABLET ORAL 2 TIMES DAILY PRN
Qty: 10 TABLET | Refills: 0 | Status: SHIPPED | OUTPATIENT
Start: 2020-04-09 | End: 2020-04-14

## 2020-05-12 ENCOUNTER — TELEMEDICINE (OUTPATIENT)
Dept: PAIN MANAGEMENT | Age: 45
End: 2020-05-12
Payer: COMMERCIAL

## 2020-05-12 PROCEDURE — 99213 OFFICE O/P EST LOW 20 MIN: CPT | Performed by: ANESTHESIOLOGY

## 2020-05-12 RX ORDER — HYDROCODONE BITARTRATE AND ACETAMINOPHEN 5; 325 MG/1; MG/1
1 TABLET ORAL 2 TIMES DAILY PRN
Qty: 14 TABLET | Refills: 0 | Status: SHIPPED | OUTPATIENT
Start: 2020-05-12 | End: 2020-05-19

## 2020-05-13 ENCOUNTER — TELEPHONE (OUTPATIENT)
Dept: PAIN MANAGEMENT | Age: 45
End: 2020-05-13

## 2020-05-13 NOTE — TELEPHONE ENCOUNTER
Marline Schrader from [address] [place] [address] called stating patient's insurance is out of network for orthopedic surgeon. Patient will contact his insurance provider for an in network orthopedic surgeon. He will call back w/ new provider.

## 2022-02-14 ENCOUNTER — VIRTUAL VISIT (OUTPATIENT)
Dept: FAMILY MEDICINE CLINIC | Age: 47
End: 2022-02-14
Payer: COMMERCIAL

## 2022-02-14 DIAGNOSIS — R50.81 FEVER IN OTHER DISEASES: ICD-10-CM

## 2022-02-14 DIAGNOSIS — R61 DIAPHORESIS: ICD-10-CM

## 2022-02-14 DIAGNOSIS — R05.9 COUGH: ICD-10-CM

## 2022-02-14 DIAGNOSIS — R68.83 CHILLS: ICD-10-CM

## 2022-02-14 DIAGNOSIS — J01.11 ACUTE RECURRENT FRONTAL SINUSITIS: Primary | ICD-10-CM

## 2022-02-14 PROCEDURE — 99213 OFFICE O/P EST LOW 20 MIN: CPT | Performed by: NURSE PRACTITIONER

## 2022-02-14 RX ORDER — DEXTROMETHORPHAN HYDROBROMIDE AND PROMETHAZINE HYDROCHLORIDE 15; 6.25 MG/5ML; MG/5ML
5 SYRUP ORAL 4 TIMES DAILY PRN
Qty: 240 ML | Refills: 0 | Status: SHIPPED | OUTPATIENT
Start: 2022-02-14 | End: 2022-08-19

## 2022-02-14 RX ORDER — AZITHROMYCIN 250 MG/1
250 TABLET, FILM COATED ORAL SEE ADMIN INSTRUCTIONS
Qty: 6 TABLET | Refills: 0 | Status: SHIPPED | OUTPATIENT
Start: 2022-02-14 | End: 2022-02-19

## 2022-02-14 SDOH — ECONOMIC STABILITY: FOOD INSECURITY: WITHIN THE PAST 12 MONTHS, YOU WORRIED THAT YOUR FOOD WOULD RUN OUT BEFORE YOU GOT MONEY TO BUY MORE.: NEVER TRUE

## 2022-02-14 SDOH — ECONOMIC STABILITY: FOOD INSECURITY: WITHIN THE PAST 12 MONTHS, THE FOOD YOU BOUGHT JUST DIDN'T LAST AND YOU DIDN'T HAVE MONEY TO GET MORE.: NEVER TRUE

## 2022-02-14 SDOH — ECONOMIC STABILITY: HOUSING INSECURITY
IN THE LAST 12 MONTHS, WAS THERE A TIME WHEN YOU DID NOT HAVE A STEADY PLACE TO SLEEP OR SLEPT IN A SHELTER (INCLUDING NOW)?: NO

## 2022-02-14 SDOH — ECONOMIC STABILITY: INCOME INSECURITY: IN THE LAST 12 MONTHS, WAS THERE A TIME WHEN YOU WERE NOT ABLE TO PAY THE MORTGAGE OR RENT ON TIME?: NO

## 2022-02-14 ASSESSMENT — ENCOUNTER SYMPTOMS
SINUS PRESSURE: 1
COUGH: 1
WHEEZING: 0
RHINORRHEA: 1
SHORTNESS OF BREATH: 0
CHEST TIGHTNESS: 1
SINUS PAIN: 1

## 2022-02-14 ASSESSMENT — SOCIAL DETERMINANTS OF HEALTH (SDOH): HOW HARD IS IT FOR YOU TO PAY FOR THE VERY BASICS LIKE FOOD, HOUSING, MEDICAL CARE, AND HEATING?: NOT HARD AT ALL

## 2022-02-14 ASSESSMENT — PATIENT HEALTH QUESTIONNAIRE - PHQ9
SUM OF ALL RESPONSES TO PHQ9 QUESTIONS 1 & 2: 0
SUM OF ALL RESPONSES TO PHQ QUESTIONS 1-9: 0
SUM OF ALL RESPONSES TO PHQ QUESTIONS 1-9: 0
2. FEELING DOWN, DEPRESSED OR HOPELESS: 0
1. LITTLE INTEREST OR PLEASURE IN DOING THINGS: 0
SUM OF ALL RESPONSES TO PHQ QUESTIONS 1-9: 0
SUM OF ALL RESPONSES TO PHQ QUESTIONS 1-9: 0

## 2022-02-14 NOTE — PATIENT INSTRUCTIONS
Sinusitis, low-grade fever, coughPhenergan dm, Z-Teddy, gargle with warm salt water solution twice a day  Call if no improvement in 72 hours

## 2022-02-14 NOTE — PROGRESS NOTES
Subjective:      Chief Complaint   Patient presents with    Cough     x 2 wks - wife tested negative for covid    Head Congestion    Fever     101       Patient ID: Isabela Bermudez is a 48 y.o. male who presents for head congestion with fever. States 2 weeks ago his wife became sick however she was tested for Covid and it was negative. He now has a lingering cough that he just cannot get rid of, congestion in his head and a fever T-max 101. His secretions are clear but he is running a fever. I asked about ill contacts and he said if 1 is negative for Covid then all were negative for Covid. HPI see above    Family History   Problem Relation Age of Onset    Heart Disease Mother         MI    Heart Disease Paternal Grandfather         MI       Social History     Socioeconomic History    Marital status:      Spouse name: Not on file    Number of children: Not on file    Years of education: Not on file    Highest education level: Not on file   Occupational History    Not on file   Tobacco Use    Smoking status: Former Smoker     Quit date: 2014     Years since quittin.1    Smokeless tobacco: Never Used   Vaping Use    Vaping Use: Never used   Substance and Sexual Activity    Alcohol use: No    Drug use: Yes     Types: Marijuana Kristin Coho)     Comment: PAIN    Sexual activity: Not on file   Other Topics Concern    Not on file   Social History Narrative    Not on file     Social Determinants of Health     Financial Resource Strain: Low Risk     Difficulty of Paying Living Expenses: Not hard at all   Food Insecurity: No Food Insecurity    Worried About [address] in the Last Year: Never true    [address] in the Last Year: Never true   Transportation Needs:     Lack of Transportation (Medical): Not on file    Lack of Transportation (Non-Medical):  Not on file   Physical Activity:     Days of Exercise per Week: Not on file    Minutes of Exercise per Session: Not on file   Stress:     Feeling of Stress : Not on file   Social Connections:     Frequency of Communication with Friends and Family: Not on file    Frequency of Social Gatherings with Friends and Family: Not on file    Attends Holiness Services: Not on file    Active Member of [address] [place] [address] [employer] or Organizations: Not on file    Attends Club or Organization Meetings: Not on file    Marital Status: Not on file   Intimate Partner Violence:     Fear of Current or Ex-Partner: Not on file    Emotionally Abused: Not on file    Physically Abused: Not on file    Sexually Abused: Not on file   Housing Stability: Unknown    Unable to Pay for Housing in the Last Year: No    Number of [place] in the Last Year: Not on file    Unstable Housing in the Last Year: No       Current Outpatient Medications on File Prior to Visit   Medication Sig Dispense Refill    acetaminophen (TYLENOL) 325 MG tablet Take 1,000 mg by mouth every 6 hours as needed for Pain       No current facility-administered medications on file prior to visit. Review of Systems   Constitutional: Positive for chills, diaphoresis, fatigue and fever. HENT: Positive for congestion, postnasal drip, rhinorrhea, sinus pressure, sinus pain and sneezing. Respiratory: Positive for cough and chest tightness. Negative for shortness of breath and wheezing. Cardiovascular: Negative. Negative for chest pain and palpitations. Objective:     Physical Exam  Constitutional:       Appearance: Normal appearance. HENT:      Head: Normocephalic and atraumatic. Nose: Congestion and rhinorrhea present. Mouth/Throat:      Pharynx: Oropharyngeal exudate present. Eyes:      Conjunctiva/sclera: Conjunctivae normal.   Pulmonary:      Breath sounds: No wheezing. Neurological:      Mental Status: He is alert and oriented to person, place, and time.    Psychiatric:         Mood and Affect: Mood normal.         Behavior: Behavior normal.         Thought Content: Thought content normal.         Judgment: Judgment normal.         Assessment:     1. Acute recurrent frontal sinusitis  Low-grade fever 101  - azithromycin (ZITHROMAX) 250 MG tablet; Take 1 tablet by mouth See Admin Instructions for 5 days 500mg on day 1 followed by 250mg on days 2 - 5  Dispense: 6 tablet; Refill: 0    2. Chills  Part of low-grade fever    3. Diaphoresis  Again part of low-grade fever    4. Fever in other diseases  Past recording 101    5. Cough  Produces thick white mucus      Plan:   As above  Discussed the possibilities of some allergic component, patient did not feel he had any allergies. Encouraged him to use Mucinex along with the medicine sent over so that the Mucinex can thin the secretions.

## 2022-08-03 ENCOUNTER — APPOINTMENT (OUTPATIENT)
Dept: GENERAL RADIOLOGY | Age: 47
End: 2022-08-03

## 2022-08-03 ENCOUNTER — HOSPITAL ENCOUNTER (EMERGENCY)
Age: 47
Discharge: HOME | End: 2022-08-03
Attending: EMERGENCY MEDICINE

## 2022-08-03 VITALS
DIASTOLIC BLOOD PRESSURE: 88 MMHG | OXYGEN SATURATION: 96 % | BODY MASS INDEX: 44.14 KG/M2 | HEIGHT: 69 IN | HEART RATE: 100 BPM | WEIGHT: 298 LBS | SYSTOLIC BLOOD PRESSURE: 143 MMHG | RESPIRATION RATE: 16 BRPM | TEMPERATURE: 99.2 F

## 2022-08-03 DIAGNOSIS — S81.811A LACERATION OF RIGHT LOWER EXTREMITY, INITIAL ENCOUNTER: Primary | ICD-10-CM

## 2022-08-03 PROCEDURE — 90471 IMMUNIZATION ADMIN: CPT | Performed by: EMERGENCY MEDICINE

## 2022-08-03 PROCEDURE — 90715 TDAP VACCINE 7 YRS/> IM: CPT | Performed by: EMERGENCY MEDICINE

## 2022-08-03 PROCEDURE — 73590 X-RAY EXAM OF LOWER LEG: CPT

## 2022-08-03 PROCEDURE — 6360000002 HC RX W HCPCS: Performed by: EMERGENCY MEDICINE

## 2022-08-03 PROCEDURE — 99284 EMERGENCY DEPT VISIT MOD MDM: CPT

## 2022-08-03 PROCEDURE — 12002 RPR S/N/AX/GEN/TRNK2.6-7.5CM: CPT

## 2022-08-03 RX ADMIN — TETANUS TOXOID, REDUCED DIPHTHERIA TOXOID AND ACELLULAR PERTUSSIS VACCINE, ADSORBED 0.5 ML: 5; 2.5; 8; 8; 2.5 SUSPENSION INTRAMUSCULAR at 13:41

## 2022-08-03 ASSESSMENT — ENCOUNTER SYMPTOMS
VOMITING: 0
NAUSEA: 0
SHORTNESS OF BREATH: 0
COUGH: 0

## 2022-08-03 ASSESSMENT — PAIN - FUNCTIONAL ASSESSMENT
PAIN_FUNCTIONAL_ASSESSMENT: 0-10
PAIN_FUNCTIONAL_ASSESSMENT: 0-10

## 2022-08-03 ASSESSMENT — PAIN DESCRIPTION - DESCRIPTORS: DESCRIPTORS: SORE

## 2022-08-03 ASSESSMENT — PAIN SCALES - GENERAL
PAINLEVEL_OUTOF10: 2
PAINLEVEL_OUTOF10: 5

## 2022-08-03 ASSESSMENT — PAIN DESCRIPTION - LOCATION: LOCATION: LEG

## 2022-08-03 ASSESSMENT — PAIN DESCRIPTION - ORIENTATION: ORIENTATION: RIGHT

## 2022-08-03 ASSESSMENT — PAIN DESCRIPTION - PAIN TYPE: TYPE: ACUTE PAIN

## 2022-08-03 NOTE — ED PROVIDER NOTES
Emergency Department Provider Note  Location: [address] [place] [address]  ED  8/3/2022     Patient Identification  Hollie Chau is a 48 y.o. male    Chief Complaint  Laceration (Patient cut right leg on a dry wall knife. )      HPI    (History provided by patient and spouse/SO)    Patient is a 48 y.o. male with a significant PMHx listed below, that presents the emergency department today with approximately a 6.5 cm laceration to the proximal tibia after cutting his own leg accidentally with a knife while installing a water line. No other injuries, did not fall. Tetanus shot not up-to-date. Does not have a foreign body sensation. Otherwise ambulating without difficulty. No recent medical concerns. Otherwise no concerns today in the ED. I have reviewed the following nursing documentation:  Allergies: No Known Allergies    Past medical history:  has no past medical history on file. Past surgical history:  has a past surgical history that includes [place] tooth extraction and Biceps tendon repair (Right, 12/13/2019). Home medications:   Prior to Admission medications    Medication Sig Start Date End Date Taking? Authorizing Provider   promethazine-dextromethorphan (PROMETHAZINE-DM) 6.25-15 MG/5ML syrup Take 5 mLs by mouth 4 times daily as needed for Cough 2/14/22   Estelitana Renteriay, APRN - CNP   acetaminophen (TYLENOL) 325 MG tablet Take 1,000 mg by mouth every 6 hours as needed for Pain    Historical Provider, MD       Social history:  reports that he quit smoking about 8 years ago. He has never used smokeless tobacco. He reports current drug use. Drug: Marijuana Mila Kugel). He reports that he does not drink alcohol. Family history:    Family History   Problem Relation Age of Onset    Heart Disease Mother         MI    Heart Disease Paternal Grandfather         MI     ROS  Review of Systems   Constitutional:  Negative for activity change, appetite change and fever.    HENT:  Negative for congestion and postnasal drip. Respiratory:  Negative for cough and shortness of breath. Cardiovascular:  Negative for chest pain. Gastrointestinal:  Negative for nausea and vomiting. Skin:  Positive for wound. Negative for rash. Neurological:  Negative for dizziness and light-headedness. Exam  Vitals:    08/03/22 1329   BP: (!) 157/92   Pulse: 96   Resp: 20   Temp: 99.2 °F (37.3 °C)   TempSrc: Oral   SpO2: 97%   Weight: 298 lb (135.2 kg)   Height: 5' 9\" (1.753 m)         Physical Exam  Constitutional:       Appearance: Normal appearance. He is normal weight. He is not ill-appearing or diaphoretic. HENT:      Head: Normocephalic and atraumatic. Right Ear: External ear normal.      Left Ear: External ear normal.      Nose: Nose normal.      Mouth/Throat:      Mouth: Mucous membranes are moist.      Pharynx: Oropharynx is clear. Eyes:      General:         Right eye: No discharge. Left eye: No discharge. Conjunctiva/sclera: Conjunctivae normal.   Cardiovascular:      Rate and Rhythm: Normal rate and regular rhythm. Pulmonary:      Effort: Pulmonary effort is normal.      Breath sounds: Normal breath sounds. Musculoskeletal:         General: Tenderness and signs of injury present. No swelling. Cervical back: Normal range of motion and neck supple. Skin:     General: Skin is warm and dry. Findings: Lesion present. No rash. Comments: There is a 6.5 cm laceration to the proximal tibia, not overlying the joint. Approximates well, bleeding controlled. No obvious large foreign bodies. Mildly tender to palpation, however no surrounding step-offs or deformities. Neurological:      Mental Status: He is alert.      ED Course    ED Medication Orders (From admission, onward)      Start Ordered     Status Ordering Provider    08/03/22 1330 08/03/22 1329  tetanus-diphth-acell pertussis (BOOSTRIX) injection 0.5 mL  ONCE         Last MAR action: Given - by JUDITH PAGAN on 08/03/22 at 1341 CARLOS MANUEL HERMAN              Radiology  XR TIBIA FIBULA RIGHT (2 VIEWS)    Result Date: 8/3/2022  EXAMINATION: 2 XRAY VIEWS OF THE RIGHT TIBIA AND FIBULA 8/3/2022 1:30 pm COMPARISON: None. HISTORY: ORDERING SYSTEM PROVIDED HISTORY: r/o FB TECHNOLOGIST PROVIDED HISTORY: Reason for exam:->r/o FB Reason for Exam: laceration, r/o FB FINDINGS: No acute fracture or dislocation. No radiopaque foreign body or soft tissue gas is identified. There is evidence of soft tissue injury along the volar aspect of the proximal calf. The knee joint and ankle joint are unremarkable as visualized. Soft tissue injury along the volar aspect of the proximal calf. No radiopaque foreign body or acute osseous findings. Labs  No results found for this visit on 08/03/22.     Procedures  Lac Repair    Date/Time: 8/3/2022 4:09 PM  Performed by: Vicente Raza DO  Authorized by: Vicente Raza DO     Consent:     Consent obtained:  Verbal    Risks, benefits, and alternatives were discussed: yes      Risks discussed:  Infection, pain and poor wound healing    Alternatives discussed:  No treatment  Universal protocol:     Procedure explained and questions answered to patient or proxy's satisfaction: yes      Immediately prior to procedure, a time out was called: yes      Patient identity confirmed:  Verbally with patient  Anesthesia:     Anesthesia method:  None  Laceration details:     Location:  Leg    Leg location:  R lower leg    Length (cm):  6.5  Pre-procedure details:     Preparation:  Patient was prepped and draped in usual sterile fashion and imaging obtained to evaluate for foreign bodies  Exploration:     Imaging obtained: x-ray      Imaging outcome: foreign body not noted      Wound exploration: entire depth of wound visualized      Wound extent: no foreign bodies/material noted, no muscle damage noted and no underlying fracture noted    Treatment:     Area cleansed with:  Chlorhexidine and saline Irrigation solution:  Sterile saline    Irrigation method:  Syringe    Debridement:  None  Skin repair:     Repair method:  Sutures    Suture size:  3-0    Suture material:  Nylon    Number of sutures:  6  Approximation:     Approximation:  Close  Repair type:     Repair type:  Complex  Post-procedure details:     Dressing:  Non-adherent dressing    Procedure completion:  Tolerated well, no immediate complications  Comments:      6 x horizontal mattress sutures placed to complex wound. CARLOS MANUEL HERMAN DO      [facility]  Patient seen and evaluated. Relevant records reviewed. - Patient is a 48 y.o. male with a 6.5 cm laceration to his right lower extremity. No obvious foreign bodies, and x-ray right lower extremity without radiopaque foreign body or osseous findings. Repaired in the emergency department. Tdap updated. Obdulio wound care at length. Instructed to follow-up in this emergency department or with his PCP in 7 days for suture removal. Patient was instructed to return to the ED should they experience any concerning new or worsening symptoms. Patient understands and agrees with the discharge plan, and I have answered all their questions at this time. Patient is stable for discharge home from the ED at this time. Medications   tetanus-diphth-acell pertussis (BOOSTRIX) injection 0.5 mL (0.5 mLs IntraMUSCular Given 8/3/22 1341)     - I have a low concern for  other emergent process, and do not see indication for further work-up in the ER, as it is unlikely  and poses more risk than benefit. - I discussed the results, including any incidental findings, with patient. Questions answered. Patient/family agreeable to plan and express understanding of plan. Disposition:  Discharge to home in fair condition. Clinical Impression:  1.  Laceration of right lower extremity, initial encounter        Blood pressure (!) 157/92, pulse 96, temperature 99.2 °F (37.3 °C), temperature source Oral, resp. rate 20, height 5' 9\" (1.753 m), weight 298 lb (135.2 kg), SpO2 97 %. Disposition referral (if applicable):  [facility]  ED  [address]  In 5 days  For suture removal      Adriana BERGER DO, am the primary attending of record and contributed the majority of evaluation and treatment of emergent care for this encounter. This chart was generated in part by using Dragon Dictation system and may contain errors related to that system including errors in grammar, punctuation, and spelling, as well as words and phrases that may be inappropriate. If there are any questions or concerns please feel free to contact the dictating provider for clarification.      Prerna PAGE 80, DO  08/03/22 5098

## 2022-08-03 NOTE — ED NOTES
Pt instructed to follow up with PCP for suture removal. Assessed per Dr Jasiel Langford, LPN  92/42/13 [id number]

## 2022-08-19 ENCOUNTER — OFFICE VISIT (OUTPATIENT)
Dept: FAMILY MEDICINE CLINIC | Age: 47
End: 2022-08-19

## 2022-08-19 VITALS
RESPIRATION RATE: 16 BRPM | HEART RATE: 94 BPM | BODY MASS INDEX: 43.71 KG/M2 | WEIGHT: 296 LBS | TEMPERATURE: 97.1 F | DIASTOLIC BLOOD PRESSURE: 92 MMHG | SYSTOLIC BLOOD PRESSURE: 141 MMHG

## 2022-08-19 DIAGNOSIS — M25.512 ACUTE PAIN OF LEFT SHOULDER: Primary | ICD-10-CM

## 2022-08-19 PROCEDURE — 99213 OFFICE O/P EST LOW 20 MIN: CPT | Performed by: NURSE PRACTITIONER

## 2022-08-19 RX ORDER — METHYLPREDNISOLONE 4 MG/1
TABLET ORAL
Qty: 1 KIT | Refills: 0 | Status: SHIPPED | OUTPATIENT
Start: 2022-08-19

## 2022-08-19 ASSESSMENT — ANXIETY QUESTIONNAIRES
4. TROUBLE RELAXING: 0
GAD7 TOTAL SCORE: 0
7. FEELING AFRAID AS IF SOMETHING AWFUL MIGHT HAPPEN: 0
1. FEELING NERVOUS, ANXIOUS, OR ON EDGE: 0
5. BEING SO RESTLESS THAT IT IS HARD TO SIT STILL: 0
2. NOT BEING ABLE TO STOP OR CONTROL WORRYING: 0
IF YOU CHECKED OFF ANY PROBLEMS ON THIS QUESTIONNAIRE, HOW DIFFICULT HAVE THESE PROBLEMS MADE IT FOR YOU TO DO YOUR WORK, TAKE CARE OF THINGS AT HOME, OR GET ALONG WITH OTHER PEOPLE: NOT DIFFICULT AT ALL
3. WORRYING TOO MUCH ABOUT DIFFERENT THINGS: 0
6. BECOMING EASILY ANNOYED OR IRRITABLE: 0

## 2022-08-19 ASSESSMENT — PATIENT HEALTH QUESTIONNAIRE - PHQ9
1. LITTLE INTEREST OR PLEASURE IN DOING THINGS: 0
DEPRESSION UNABLE TO ASSESS: FUNCTIONAL CAPACITY MOTIVATION LIMITS ACCURACY
SUM OF ALL RESPONSES TO PHQ QUESTIONS 1-9: 0
SUM OF ALL RESPONSES TO PHQ9 QUESTIONS 1 & 2: 0
SUM OF ALL RESPONSES TO PHQ QUESTIONS 1-9: 0
SUM OF ALL RESPONSES TO PHQ QUESTIONS 1-9: 0
2. FEELING DOWN, DEPRESSED OR HOPELESS: 0
SUM OF ALL RESPONSES TO PHQ QUESTIONS 1-9: 0

## 2022-08-19 NOTE — PROGRESS NOTES
43.71 kg/m² as calculated from the following:    Height as of 8/3/22: 5' 9\" (1.753 m). Weight as of this encounter: 296 lb (134.3 kg). Physical Exam  Vitals and nursing note reviewed. Constitutional:       General: He is not in acute distress. Appearance: Normal appearance. He is well-developed. He is not ill-appearing, toxic-appearing or diaphoretic. HENT:      Head: Normocephalic and atraumatic. Eyes:      Conjunctiva/sclera: Conjunctivae normal.      Pupils: Pupils are equal, round, and reactive to light. Cardiovascular:      Rate and Rhythm: Normal rate and regular rhythm. Heart sounds: Normal heart sounds. No murmur heard. No friction rub. No gallop. Pulmonary:      Effort: Pulmonary effort is normal. No respiratory distress. Breath sounds: Normal breath sounds. No stridor. No wheezing, rhonchi or rales. Musculoskeletal:      Left shoulder: No swelling, deformity, effusion, laceration, tenderness, bony tenderness or crepitus. Decreased range of motion. Decreased strength. Neurological:      General: No focal deficit present. Mental Status: He is alert and oriented to person, place, and time. Mental status is at baseline. Cranial Nerves: No cranial nerve deficit. ASSESSMENT/PLAN:  1. Acute pain of left shoulder  - methylPREDNISolone (MEDROL DOSEPACK) 4 MG tablet; Take by mouth. Dispense: 1 kit; Refill: 0  - XR SHOULDER LEFT (MIN 2 VIEWS); Future    Start thyroid taper, shoulder exercises gently  Shoulder x-ray  1 week follow-up as needed. An electronic signature was used to authenticate this note.     --RICHARD Rm - CNP on 8/19/2022 at 4:30 PM

## 2022-08-19 NOTE — PATIENT INSTRUCTIONS
Medrol dose pack (oral steroid taper)- you should avoid taking NSAIDs while on this medication (ex: ibuprofen, aleve, aspirin). Tylenol is OK to take.    Shoulder xray  Continue ROM exercises
